# Patient Record
Sex: MALE | Race: WHITE | URBAN - METROPOLITAN AREA
[De-identification: names, ages, dates, MRNs, and addresses within clinical notes are randomized per-mention and may not be internally consistent; named-entity substitution may affect disease eponyms.]

---

## 2017-03-27 ENCOUNTER — AMBULATORY - HEALTHEAST (OUTPATIENT)
Dept: CARDIOLOGY | Facility: CLINIC | Age: 82
End: 2017-03-27

## 2017-03-27 DIAGNOSIS — I44.2 THIRD DEGREE AV BLOCK (H): ICD-10-CM

## 2017-03-27 DIAGNOSIS — I35.0 AORTIC VALVE STENOSIS, MILD: ICD-10-CM

## 2017-03-27 DIAGNOSIS — Z95.0 PACEMAKER: ICD-10-CM

## 2017-03-27 ASSESSMENT — MIFFLIN-ST. JEOR: SCORE: 1374.22

## 2017-06-28 ENCOUNTER — AMBULATORY - HEALTHEAST (OUTPATIENT)
Dept: CARDIOLOGY | Facility: CLINIC | Age: 82
End: 2017-06-28

## 2017-06-28 DIAGNOSIS — Z95.0 PACEMAKER: ICD-10-CM

## 2017-06-28 LAB — HCC DEVICE COMMENTS: NORMAL

## 2017-10-04 ENCOUNTER — AMBULATORY - HEALTHEAST (OUTPATIENT)
Dept: CARDIOLOGY | Facility: CLINIC | Age: 82
End: 2017-10-04

## 2017-10-04 DIAGNOSIS — Z95.0 PACEMAKER: ICD-10-CM

## 2017-10-04 LAB — HCC DEVICE COMMENTS: NORMAL

## 2018-01-11 ENCOUNTER — AMBULATORY - HEALTHEAST (OUTPATIENT)
Dept: CARDIOLOGY | Facility: CLINIC | Age: 83
End: 2018-01-11

## 2018-01-11 DIAGNOSIS — Z95.0 PACEMAKER: ICD-10-CM

## 2018-01-11 LAB — HCC DEVICE COMMENTS: NORMAL

## 2018-01-18 ENCOUNTER — RECORDS - HEALTHEAST (OUTPATIENT)
Dept: LAB | Facility: CLINIC | Age: 83
End: 2018-01-18

## 2018-01-19 LAB
ANION GAP SERPL CALCULATED.3IONS-SCNC: 5 MMOL/L (ref 5–18)
BASOPHILS # BLD AUTO: 0.2 THOU/UL (ref 0–0.2)
BASOPHILS NFR BLD AUTO: 2 % (ref 0–2)
BUN SERPL-MCNC: 20 MG/DL (ref 8–28)
CALCIUM SERPL-MCNC: 10.6 MG/DL (ref 8.5–10.5)
CHLORIDE BLD-SCNC: 100 MMOL/L (ref 98–107)
CO2 SERPL-SCNC: 30 MMOL/L (ref 22–31)
CREAT SERPL-MCNC: 0.81 MG/DL (ref 0.7–1.3)
EOSINOPHIL # BLD AUTO: 1.1 THOU/UL (ref 0–0.4)
EOSINOPHIL NFR BLD AUTO: 11 % (ref 0–6)
ERYTHROCYTE [DISTWIDTH] IN BLOOD BY AUTOMATED COUNT: 12.2 % (ref 11–14.5)
GFR SERPL CREATININE-BSD FRML MDRD: >60 ML/MIN/1.73M2
GLUCOSE BLD-MCNC: 98 MG/DL (ref 70–125)
HCT VFR BLD AUTO: 42.6 % (ref 40–54)
HGB BLD-MCNC: 13.9 G/DL (ref 14–18)
LYMPHOCYTES # BLD AUTO: 3.3 THOU/UL (ref 0.8–4.4)
LYMPHOCYTES NFR BLD AUTO: 35 % (ref 20–40)
MCH RBC QN AUTO: 32.5 PG (ref 27–34)
MCHC RBC AUTO-ENTMCNC: 32.6 G/DL (ref 32–36)
MCV RBC AUTO: 100 FL (ref 80–100)
MONOCYTES # BLD AUTO: 0.8 THOU/UL (ref 0–0.9)
MONOCYTES NFR BLD AUTO: 9 % (ref 2–10)
NEUTROPHILS # BLD AUTO: 4.1 THOU/UL (ref 2–7.7)
NEUTROPHILS NFR BLD AUTO: 43 % (ref 50–70)
PLATELET # BLD AUTO: 257 THOU/UL (ref 140–440)
PMV BLD AUTO: 11 FL (ref 8.5–12.5)
POTASSIUM BLD-SCNC: 4.3 MMOL/L (ref 3.5–5)
RBC # BLD AUTO: 4.28 MILL/UL (ref 4.4–6.2)
SODIUM SERPL-SCNC: 135 MMOL/L (ref 136–145)
WBC: 9.4 THOU/UL (ref 4–11)

## 2018-01-31 ENCOUNTER — AMBULATORY - HEALTHEAST (OUTPATIENT)
Dept: CARDIOLOGY | Facility: CLINIC | Age: 83
End: 2018-01-31

## 2018-01-31 DIAGNOSIS — I35.0 AORTIC STENOSIS: ICD-10-CM

## 2018-02-01 ENCOUNTER — RECORDS - HEALTHEAST (OUTPATIENT)
Dept: ADMINISTRATIVE | Facility: OTHER | Age: 83
End: 2018-02-01

## 2018-03-01 ENCOUNTER — AMBULATORY - HEALTHEAST (OUTPATIENT)
Dept: CARDIOLOGY | Facility: CLINIC | Age: 83
End: 2018-03-01

## 2018-03-01 ENCOUNTER — RECORDS - HEALTHEAST (OUTPATIENT)
Dept: ADMINISTRATIVE | Facility: OTHER | Age: 83
End: 2018-03-01

## 2018-03-07 ENCOUNTER — AMBULATORY - HEALTHEAST (OUTPATIENT)
Dept: CARDIOLOGY | Facility: CLINIC | Age: 83
End: 2018-03-07

## 2018-03-07 DIAGNOSIS — I10 ESSENTIAL HYPERTENSION, BENIGN: ICD-10-CM

## 2018-03-07 DIAGNOSIS — Z95.0 CARDIAC PACEMAKER IN SITU: ICD-10-CM

## 2018-03-07 DIAGNOSIS — I35.0 AORTIC VALVE STENOSIS, MILD: ICD-10-CM

## 2018-03-07 DIAGNOSIS — I44.2 THIRD DEGREE AV BLOCK (H): ICD-10-CM

## 2018-03-07 LAB — HCC DEVICE COMMENTS: NORMAL

## 2018-03-07 ASSESSMENT — MIFFLIN-ST. JEOR: SCORE: 1283.51

## 2018-06-06 ENCOUNTER — AMBULATORY - HEALTHEAST (OUTPATIENT)
Dept: CARDIOLOGY | Facility: CLINIC | Age: 83
End: 2018-06-06

## 2018-06-06 DIAGNOSIS — Z95.0 CARDIAC PACEMAKER IN SITU: ICD-10-CM

## 2018-06-06 LAB
HCC DEVICE COMMENTS: NORMAL
HCC DEVICE IMPLANTING PROVIDER: NORMAL
HCC DEVICE MANUFACTURE: NORMAL
HCC DEVICE MODEL: NORMAL
HCC DEVICE SERIAL NUMBER: NORMAL
HCC DEVICE TYPE: NORMAL

## 2018-08-20 ENCOUNTER — HOME CARE/HOSPICE - HEALTHEAST (OUTPATIENT)
Dept: HOSPICE | Facility: HOSPICE | Age: 83
End: 2018-08-20

## 2018-08-21 ENCOUNTER — HOME CARE/HOSPICE - HEALTHEAST (OUTPATIENT)
Dept: HOSPICE | Facility: HOSPICE | Age: 83
End: 2018-08-21

## 2018-08-22 ENCOUNTER — HOME CARE/HOSPICE - HEALTHEAST (OUTPATIENT)
Dept: HOSPICE | Facility: HOSPICE | Age: 83
End: 2018-08-22

## 2018-08-27 ENCOUNTER — OFFICE VISIT - HEALTHEAST (OUTPATIENT)
Dept: GERIATRICS | Facility: CLINIC | Age: 83
End: 2018-08-27

## 2018-08-27 DIAGNOSIS — R62.7 ADULT FAILURE TO THRIVE: ICD-10-CM

## 2018-08-27 DIAGNOSIS — G89.29 CHRONIC PAIN: ICD-10-CM

## 2018-08-27 DIAGNOSIS — I48.91 ATRIAL FIBRILLATION (H): ICD-10-CM

## 2018-08-27 DIAGNOSIS — D64.9 ANEMIA: ICD-10-CM

## 2018-08-27 DIAGNOSIS — I10 ESSENTIAL HYPERTENSION: ICD-10-CM

## 2018-09-12 ENCOUNTER — AMBULATORY - HEALTHEAST (OUTPATIENT)
Dept: CARDIOLOGY | Facility: CLINIC | Age: 83
End: 2018-09-12

## 2018-09-12 DIAGNOSIS — Z95.0 CARDIAC PACEMAKER IN SITU: ICD-10-CM

## 2018-09-26 ENCOUNTER — OFFICE VISIT - HEALTHEAST (OUTPATIENT)
Dept: GERIATRICS | Facility: CLINIC | Age: 83
End: 2018-09-26

## 2018-09-26 DIAGNOSIS — I10 ESSENTIAL HYPERTENSION, BENIGN: ICD-10-CM

## 2018-09-26 DIAGNOSIS — M54.9 BACK PAIN: ICD-10-CM

## 2018-09-26 DIAGNOSIS — M62.81 GENERALIZED MUSCLE WEAKNESS: ICD-10-CM

## 2018-09-26 RX ORDER — BISACODYL 10 MG
10 SUPPOSITORY, RECTAL RECTAL DAILY PRN
Status: SHIPPED | COMMUNITY
Start: 2018-09-26

## 2018-09-26 RX ORDER — CALCIUM CARBONATE 500 MG/1
1 TABLET, CHEWABLE ORAL 3 TIMES DAILY PRN
Status: SHIPPED | COMMUNITY
Start: 2018-09-26

## 2018-10-30 ENCOUNTER — OFFICE VISIT - HEALTHEAST (OUTPATIENT)
Dept: GERIATRICS | Facility: CLINIC | Age: 83
End: 2018-10-30

## 2018-10-30 DIAGNOSIS — I48.91 ATRIAL FIBRILLATION (H): ICD-10-CM

## 2018-10-30 DIAGNOSIS — D64.9 ANEMIA: ICD-10-CM

## 2018-10-30 DIAGNOSIS — I10 HYPERTENSION: ICD-10-CM

## 2018-10-30 DIAGNOSIS — G89.29 CHRONIC PAIN: ICD-10-CM

## 2018-11-14 ENCOUNTER — OFFICE VISIT - HEALTHEAST (OUTPATIENT)
Dept: GERIATRICS | Facility: CLINIC | Age: 83
End: 2018-11-14

## 2018-11-14 DIAGNOSIS — R06.02 SOB (SHORTNESS OF BREATH): ICD-10-CM

## 2018-11-14 DIAGNOSIS — I10 ESSENTIAL HYPERTENSION, BENIGN: ICD-10-CM

## 2018-11-15 ENCOUNTER — COMMUNICATION - HEALTHEAST (OUTPATIENT)
Dept: GERIATRICS | Facility: CLINIC | Age: 83
End: 2018-11-15

## 2018-11-15 ENCOUNTER — RECORDS - HEALTHEAST (OUTPATIENT)
Dept: LAB | Facility: CLINIC | Age: 83
End: 2018-11-15

## 2018-11-15 LAB
ANION GAP SERPL CALCULATED.3IONS-SCNC: 7 MMOL/L (ref 5–18)
BUN SERPL-MCNC: 16 MG/DL (ref 8–28)
CALCIUM SERPL-MCNC: 10.3 MG/DL (ref 8.5–10.5)
CHLORIDE BLD-SCNC: 100 MMOL/L (ref 98–107)
CO2 SERPL-SCNC: 30 MMOL/L (ref 22–31)
CREAT SERPL-MCNC: 0.71 MG/DL (ref 0.7–1.3)
ERYTHROCYTE [DISTWIDTH] IN BLOOD BY AUTOMATED COUNT: 12.6 % (ref 11–14.5)
GFR SERPL CREATININE-BSD FRML MDRD: >60 ML/MIN/1.73M2
GLUCOSE BLD-MCNC: 84 MG/DL (ref 70–125)
HCT VFR BLD AUTO: 39.9 % (ref 40–54)
HGB BLD-MCNC: 12.6 G/DL (ref 14–18)
MCH RBC QN AUTO: 32.1 PG (ref 27–34)
MCHC RBC AUTO-ENTMCNC: 31.6 G/DL (ref 32–36)
MCV RBC AUTO: 102 FL (ref 80–100)
PLATELET # BLD AUTO: 288 THOU/UL (ref 140–440)
PMV BLD AUTO: 10.5 FL (ref 8.5–12.5)
POTASSIUM BLD-SCNC: 4.3 MMOL/L (ref 3.5–5)
RBC # BLD AUTO: 3.93 MILL/UL (ref 4.4–6.2)
SODIUM SERPL-SCNC: 137 MMOL/L (ref 136–145)
WBC: 11 THOU/UL (ref 4–11)

## 2018-11-20 ENCOUNTER — RECORDS - HEALTHEAST (OUTPATIENT)
Dept: LAB | Facility: CLINIC | Age: 83
End: 2018-11-20

## 2018-11-21 LAB
ANION GAP SERPL CALCULATED.3IONS-SCNC: 5 MMOL/L (ref 5–18)
BUN SERPL-MCNC: 19 MG/DL (ref 8–28)
CALCIUM SERPL-MCNC: 10.1 MG/DL (ref 8.5–10.5)
CHLORIDE BLD-SCNC: 100 MMOL/L (ref 98–107)
CO2 SERPL-SCNC: 30 MMOL/L (ref 22–31)
CREAT SERPL-MCNC: 0.71 MG/DL (ref 0.7–1.3)
GFR SERPL CREATININE-BSD FRML MDRD: >60 ML/MIN/1.73M2
GLUCOSE BLD-MCNC: 89 MG/DL (ref 70–125)
POTASSIUM BLD-SCNC: 4.4 MMOL/L (ref 3.5–5)
SODIUM SERPL-SCNC: 135 MMOL/L (ref 136–145)

## 2018-11-23 ENCOUNTER — RECORDS - HEALTHEAST (OUTPATIENT)
Dept: LAB | Facility: CLINIC | Age: 83
End: 2018-11-23

## 2018-11-26 LAB
ANION GAP SERPL CALCULATED.3IONS-SCNC: 8 MMOL/L (ref 5–18)
BUN SERPL-MCNC: 18 MG/DL (ref 8–28)
CALCIUM SERPL-MCNC: 10.4 MG/DL (ref 8.5–10.5)
CHLORIDE BLD-SCNC: 100 MMOL/L (ref 98–107)
CO2 SERPL-SCNC: 29 MMOL/L (ref 22–31)
CREAT SERPL-MCNC: 0.75 MG/DL (ref 0.7–1.3)
GFR SERPL CREATININE-BSD FRML MDRD: >60 ML/MIN/1.73M2
GLUCOSE BLD-MCNC: 93 MG/DL (ref 70–125)
POTASSIUM BLD-SCNC: 4.3 MMOL/L (ref 3.5–5)
SODIUM SERPL-SCNC: 137 MMOL/L (ref 136–145)

## 2018-12-18 ENCOUNTER — AMBULATORY - HEALTHEAST (OUTPATIENT)
Dept: CARDIOLOGY | Facility: CLINIC | Age: 83
End: 2018-12-18

## 2018-12-18 DIAGNOSIS — Z95.0 CARDIAC PACEMAKER IN SITU: ICD-10-CM

## 2019-01-02 ENCOUNTER — OFFICE VISIT - HEALTHEAST (OUTPATIENT)
Dept: GERIATRICS | Facility: CLINIC | Age: 84
End: 2019-01-02

## 2019-01-02 DIAGNOSIS — M62.81 GENERALIZED MUSCLE WEAKNESS: ICD-10-CM

## 2019-01-02 DIAGNOSIS — G89.4 CHRONIC PAIN SYNDROME: ICD-10-CM

## 2019-01-02 DIAGNOSIS — I10 ESSENTIAL HYPERTENSION, BENIGN: ICD-10-CM

## 2019-01-02 DIAGNOSIS — I48.91 ATRIAL FIBRILLATION, UNSPECIFIED TYPE (H): ICD-10-CM

## 2019-01-18 ENCOUNTER — AMBULATORY - HEALTHEAST (OUTPATIENT)
Dept: CARDIOLOGY | Facility: CLINIC | Age: 84
End: 2019-01-18

## 2019-01-18 DIAGNOSIS — I35.0 AORTIC STENOSIS: ICD-10-CM

## 2019-02-15 ENCOUNTER — AMBULATORY - HEALTHEAST (OUTPATIENT)
Dept: CARDIOLOGY | Facility: CLINIC | Age: 84
End: 2019-02-15

## 2019-02-21 ENCOUNTER — RECORDS - HEALTHEAST (OUTPATIENT)
Dept: ADMINISTRATIVE | Facility: OTHER | Age: 84
End: 2019-02-21

## 2019-02-26 ENCOUNTER — OFFICE VISIT - HEALTHEAST (OUTPATIENT)
Dept: GERIATRICS | Facility: CLINIC | Age: 84
End: 2019-02-26

## 2019-02-26 DIAGNOSIS — I48.0 PAROXYSMAL ATRIAL FIBRILLATION (H): ICD-10-CM

## 2019-02-26 DIAGNOSIS — G89.4 CHRONIC PAIN SYNDROME: ICD-10-CM

## 2019-02-26 DIAGNOSIS — I10 ESSENTIAL HYPERTENSION: ICD-10-CM

## 2019-02-26 DIAGNOSIS — D64.9 ANEMIA, UNSPECIFIED TYPE: ICD-10-CM

## 2019-03-20 ENCOUNTER — AMBULATORY - HEALTHEAST (OUTPATIENT)
Dept: CARDIOLOGY | Facility: CLINIC | Age: 84
End: 2019-03-20

## 2019-03-20 DIAGNOSIS — Z95.0 CARDIAC PACEMAKER IN SITU: ICD-10-CM

## 2019-04-03 ENCOUNTER — OFFICE VISIT - HEALTHEAST (OUTPATIENT)
Dept: GERIATRICS | Facility: CLINIC | Age: 84
End: 2019-04-03

## 2019-04-03 DIAGNOSIS — M62.81 GENERALIZED MUSCLE WEAKNESS: ICD-10-CM

## 2019-04-03 DIAGNOSIS — G89.4 CHRONIC PAIN SYNDROME: ICD-10-CM

## 2019-04-03 DIAGNOSIS — I10 ESSENTIAL HYPERTENSION: ICD-10-CM

## 2019-04-03 DIAGNOSIS — I48.0 PAROXYSMAL ATRIAL FIBRILLATION (H): ICD-10-CM

## 2019-04-04 ENCOUNTER — RECORDS - HEALTHEAST (OUTPATIENT)
Dept: LAB | Facility: CLINIC | Age: 84
End: 2019-04-04

## 2019-04-05 LAB
ANION GAP SERPL CALCULATED.3IONS-SCNC: 5 MMOL/L (ref 5–18)
BUN SERPL-MCNC: 16 MG/DL (ref 8–28)
CALCIUM SERPL-MCNC: 10.1 MG/DL (ref 8.5–10.5)
CHLORIDE BLD-SCNC: 103 MMOL/L (ref 98–107)
CO2 SERPL-SCNC: 30 MMOL/L (ref 22–31)
CREAT SERPL-MCNC: 0.67 MG/DL (ref 0.7–1.3)
GFR SERPL CREATININE-BSD FRML MDRD: >60 ML/MIN/1.73M2
GLUCOSE BLD-MCNC: 84 MG/DL (ref 70–125)
POTASSIUM BLD-SCNC: 4.1 MMOL/L (ref 3.5–5)
SODIUM SERPL-SCNC: 138 MMOL/L (ref 136–145)

## 2019-06-24 ENCOUNTER — AMBULATORY - HEALTHEAST (OUTPATIENT)
Dept: CARDIOLOGY | Facility: CLINIC | Age: 84
End: 2019-06-24

## 2019-06-24 DIAGNOSIS — Z95.0 CARDIAC PACEMAKER IN SITU: ICD-10-CM

## 2019-06-27 ENCOUNTER — OFFICE VISIT - HEALTHEAST (OUTPATIENT)
Dept: GERIATRICS | Facility: CLINIC | Age: 84
End: 2019-06-27

## 2019-06-27 DIAGNOSIS — G89.4 CHRONIC PAIN SYNDROME: ICD-10-CM

## 2019-06-27 DIAGNOSIS — I10 ESSENTIAL HYPERTENSION: ICD-10-CM

## 2019-06-27 DIAGNOSIS — R53.81 DEBILITY: ICD-10-CM

## 2019-06-27 DIAGNOSIS — I48.0 PAROXYSMAL ATRIAL FIBRILLATION (H): ICD-10-CM

## 2019-07-08 ENCOUNTER — COMMUNICATION - HEALTHEAST (OUTPATIENT)
Dept: CARDIOLOGY | Facility: CLINIC | Age: 84
End: 2019-07-08

## 2019-07-25 ENCOUNTER — OFFICE VISIT - HEALTHEAST (OUTPATIENT)
Dept: GERIATRICS | Facility: CLINIC | Age: 84
End: 2019-07-25

## 2019-07-25 DIAGNOSIS — R68.89 CONGESTION OF THROAT: ICD-10-CM

## 2019-08-27 ENCOUNTER — RECORDS - HEALTHEAST (OUTPATIENT)
Dept: ADMINISTRATIVE | Facility: OTHER | Age: 84
End: 2019-08-27

## 2019-09-11 ENCOUNTER — OFFICE VISIT - HEALTHEAST (OUTPATIENT)
Dept: GERIATRICS | Facility: CLINIC | Age: 84
End: 2019-09-11

## 2019-09-11 DIAGNOSIS — G89.29 OTHER CHRONIC PAIN: ICD-10-CM

## 2019-09-11 DIAGNOSIS — I48.91 ATRIAL FIBRILLATION, UNSPECIFIED TYPE (H): ICD-10-CM

## 2019-09-11 DIAGNOSIS — I10 ESSENTIAL HYPERTENSION: ICD-10-CM

## 2019-09-11 DIAGNOSIS — R53.81 DEBILITY: ICD-10-CM

## 2019-09-24 ENCOUNTER — AMBULATORY - HEALTHEAST (OUTPATIENT)
Dept: CARDIOLOGY | Facility: CLINIC | Age: 84
End: 2019-09-24

## 2019-09-24 DIAGNOSIS — Z95.0 CARDIAC PACEMAKER IN SITU: ICD-10-CM

## 2019-10-31 ENCOUNTER — OFFICE VISIT - HEALTHEAST (OUTPATIENT)
Dept: GERIATRICS | Facility: CLINIC | Age: 84
End: 2019-10-31

## 2019-10-31 DIAGNOSIS — I10 ESSENTIAL HYPERTENSION: ICD-10-CM

## 2019-10-31 DIAGNOSIS — G89.4 CHRONIC PAIN SYNDROME: ICD-10-CM

## 2019-10-31 DIAGNOSIS — I48.91 ATRIAL FIBRILLATION, UNSPECIFIED TYPE (H): ICD-10-CM

## 2019-10-31 DIAGNOSIS — R53.81 DEBILITY: ICD-10-CM

## 2019-12-06 ENCOUNTER — OFFICE VISIT - HEALTHEAST (OUTPATIENT)
Dept: GERIATRICS | Facility: CLINIC | Age: 84
End: 2019-12-06

## 2019-12-06 DIAGNOSIS — R68.89 CONGESTION OF THROAT: ICD-10-CM

## 2019-12-06 DIAGNOSIS — G89.4 CHRONIC PAIN SYNDROME: ICD-10-CM

## 2019-12-06 DIAGNOSIS — I48.91 ATRIAL FIBRILLATION, UNSPECIFIED TYPE (H): ICD-10-CM

## 2019-12-16 ENCOUNTER — RECORDS - HEALTHEAST (OUTPATIENT)
Dept: LAB | Facility: CLINIC | Age: 84
End: 2019-12-16

## 2019-12-16 ENCOUNTER — OFFICE VISIT - HEALTHEAST (OUTPATIENT)
Dept: GERIATRICS | Facility: CLINIC | Age: 84
End: 2019-12-16

## 2019-12-16 DIAGNOSIS — R05.9 COUGH: ICD-10-CM

## 2019-12-16 DIAGNOSIS — R09.89 BIBASILAR CRACKLES: ICD-10-CM

## 2019-12-16 DIAGNOSIS — R50.9 FEVER, UNSPECIFIED FEVER CAUSE: ICD-10-CM

## 2019-12-16 LAB
ERYTHROCYTE [DISTWIDTH] IN BLOOD BY AUTOMATED COUNT: 12.6 % (ref 11–14.5)
HCT VFR BLD AUTO: 37.2 % (ref 40–54)
HGB BLD-MCNC: 12.1 G/DL (ref 14–18)
MCH RBC QN AUTO: 31.9 PG (ref 27–34)
MCHC RBC AUTO-ENTMCNC: 32.5 G/DL (ref 32–36)
MCV RBC AUTO: 98 FL (ref 80–100)
PLATELET # BLD AUTO: 208 THOU/UL (ref 140–440)
PMV BLD AUTO: 10.4 FL (ref 8.5–12.5)
RBC # BLD AUTO: 3.79 MILL/UL (ref 4.4–6.2)
WBC: 15.4 THOU/UL (ref 4–11)

## 2019-12-16 RX ORDER — METOPROLOL SUCCINATE 25 MG/1
25 TABLET, EXTENDED RELEASE ORAL DAILY
Status: SHIPPED | COMMUNITY
Start: 2019-12-16

## 2019-12-18 ENCOUNTER — AMBULATORY - HEALTHEAST (OUTPATIENT)
Dept: CARDIOLOGY | Facility: CLINIC | Age: 84
End: 2019-12-18

## 2019-12-18 DIAGNOSIS — Z95.0 CARDIAC PACEMAKER IN SITU: ICD-10-CM

## 2019-12-18 DIAGNOSIS — I44.2 THIRD DEGREE AV BLOCK (H): ICD-10-CM

## 2019-12-26 ENCOUNTER — AMBULATORY - HEALTHEAST (OUTPATIENT)
Dept: CARDIOLOGY | Facility: CLINIC | Age: 84
End: 2019-12-26

## 2019-12-26 DIAGNOSIS — Z95.0 CARDIAC PACEMAKER IN SITU: ICD-10-CM

## 2019-12-26 DIAGNOSIS — I44.2 THIRD DEGREE AV BLOCK (H): ICD-10-CM

## 2020-01-01 ENCOUNTER — OFFICE VISIT - HEALTHEAST (OUTPATIENT)
Dept: GERIATRICS | Facility: CLINIC | Age: 85
End: 2020-01-01

## 2020-01-01 ENCOUNTER — COMMUNICATION - HEALTHEAST (OUTPATIENT)
Dept: CARDIOLOGY | Facility: CLINIC | Age: 85
End: 2020-01-01

## 2020-01-01 ENCOUNTER — AMBULATORY - HEALTHEAST (OUTPATIENT)
Dept: CARDIOLOGY | Facility: CLINIC | Age: 85
End: 2020-01-01

## 2020-01-01 ENCOUNTER — RECORDS - HEALTHEAST (OUTPATIENT)
Dept: ADMINISTRATIVE | Facility: OTHER | Age: 85
End: 2020-01-01

## 2020-01-01 DIAGNOSIS — I10 ESSENTIAL HYPERTENSION: ICD-10-CM

## 2020-01-01 DIAGNOSIS — R05.9 COUGH: ICD-10-CM

## 2020-01-01 DIAGNOSIS — G89.4 CHRONIC PAIN SYNDROME: ICD-10-CM

## 2020-01-01 DIAGNOSIS — I44.2 THIRD DEGREE AV BLOCK (H): ICD-10-CM

## 2020-01-01 DIAGNOSIS — R53.81 DEBILITATED: ICD-10-CM

## 2020-01-01 DIAGNOSIS — Z95.0 CARDIAC PACEMAKER IN SITU: ICD-10-CM

## 2020-01-01 DIAGNOSIS — R53.81 PHYSICAL DECONDITIONING: ICD-10-CM

## 2020-01-01 DIAGNOSIS — R53.81 DEBILITY: ICD-10-CM

## 2020-01-01 DIAGNOSIS — I10 ESSENTIAL HYPERTENSION, BENIGN: ICD-10-CM

## 2020-01-01 DIAGNOSIS — I48.91 ATRIAL FIBRILLATION, UNSPECIFIED TYPE (H): ICD-10-CM

## 2020-01-01 DIAGNOSIS — M62.81 GENERALIZED MUSCLE WEAKNESS: ICD-10-CM

## 2020-01-01 DIAGNOSIS — I48.0 PAROXYSMAL ATRIAL FIBRILLATION (H): ICD-10-CM

## 2020-01-01 DIAGNOSIS — M15.0 PRIMARY OSTEOARTHRITIS INVOLVING MULTIPLE JOINTS: ICD-10-CM

## 2020-01-01 DIAGNOSIS — R53.81 PHYSICAL DEBILITY: ICD-10-CM

## 2020-01-01 RX ORDER — LORATADINE 10 MG/1
10 TABLET ORAL DAILY
Status: SHIPPED | COMMUNITY
Start: 2020-01-01

## 2021-01-01 ENCOUNTER — RECORDS - HEALTHEAST (OUTPATIENT)
Dept: LAB | Facility: CLINIC | Age: 86
End: 2021-01-01

## 2021-01-01 ENCOUNTER — AMBULATORY - HEALTHEAST (OUTPATIENT)
Dept: CARDIOLOGY | Facility: CLINIC | Age: 86
End: 2021-01-01

## 2021-01-01 ENCOUNTER — COMMUNICATION - HEALTHEAST (OUTPATIENT)
Dept: GERIATRICS | Facility: CLINIC | Age: 86
End: 2021-01-01

## 2021-01-01 DIAGNOSIS — I44.2 THIRD DEGREE AV BLOCK (H): ICD-10-CM

## 2021-01-01 DIAGNOSIS — Z95.0 CARDIAC PACEMAKER IN SITU: ICD-10-CM

## 2021-01-01 LAB
ALBUMIN UR-MCNC: ABNORMAL MG/DL
ANION GAP SERPL CALCULATED.3IONS-SCNC: 11 MMOL/L (ref 5–18)
APPEARANCE UR: ABNORMAL
BACTERIA #/AREA URNS HPF: ABNORMAL HPF
BACTERIA SPEC CULT: NO GROWTH
BASOPHILS # BLD AUTO: 0.1 THOU/UL (ref 0–0.2)
BASOPHILS NFR BLD AUTO: 1 % (ref 0–2)
BILIRUB UR QL STRIP: NEGATIVE
BUN SERPL-MCNC: 23 MG/DL (ref 8–28)
CALCIUM SERPL-MCNC: 9.7 MG/DL (ref 8.5–10.5)
CAOX CRY #/AREA URNS HPF: PRESENT /[HPF]
CHLORIDE BLD-SCNC: 97 MMOL/L (ref 98–107)
CO2 SERPL-SCNC: 28 MMOL/L (ref 22–31)
COLOR UR AUTO: YELLOW
CREAT SERPL-MCNC: 0.81 MG/DL (ref 0.7–1.3)
EOSINOPHIL COUNT (ABSOLUTE): 0 THOU/UL (ref 0–0.4)
EOSINOPHIL NFR BLD AUTO: 0 % (ref 0–6)
ERYTHROCYTE [DISTWIDTH] IN BLOOD BY AUTOMATED COUNT: 13 % (ref 11–14.5)
FLUAV AG SPEC QL IA: NORMAL
FLUBV AG SPEC QL IA: NORMAL
GFR SERPL CREATININE-BSD FRML MDRD: >60 ML/MIN/1.73M2
GLUCOSE BLD-MCNC: 81 MG/DL (ref 70–125)
GLUCOSE UR STRIP-MCNC: NEGATIVE MG/DL
HCC DEVICE COMMENTS: NORMAL
HCC DEVICE IMPLANTING PROVIDER: NORMAL
HCC DEVICE MANUFACTURE: NORMAL
HCC DEVICE MODEL: NORMAL
HCC DEVICE SERIAL NUMBER: NORMAL
HCC DEVICE TYPE: NORMAL
HCT VFR BLD AUTO: 45 % (ref 40–54)
HGB BLD-MCNC: 14.7 G/DL (ref 14–18)
HGB UR QL STRIP: NEGATIVE
HYALINE CASTS #/AREA URNS LPF: ABNORMAL LPF
IMMATURE GRANULOCYTE % - MAN (DIFF): 0 %
IMMATURE GRANULOCYTE ABSOLUTE - MAN (DIFF): 0 THOU/UL
KETONES UR STRIP-MCNC: ABNORMAL MG/DL
LEUKOCYTE ESTERASE UR QL STRIP: ABNORMAL
LYMPHOCYTES # BLD AUTO: 1.5 THOU/UL (ref 0.8–4.4)
LYMPHOCYTES NFR BLD AUTO: 12 % (ref 20–40)
MCH RBC QN AUTO: 32.4 PG (ref 27–34)
MCHC RBC AUTO-ENTMCNC: 32.7 G/DL (ref 32–36)
MCV RBC AUTO: 99 FL (ref 80–100)
MONOCYTES # BLD AUTO: 0.7 THOU/UL (ref 0–0.9)
MONOCYTES NFR BLD AUTO: 6 % (ref 2–10)
MUCOUS THREADS #/AREA URNS LPF: ABNORMAL LPF
NITRATE UR QL: NEGATIVE
PH UR STRIP: 6 [PH] (ref 4.5–8)
PLAT MORPH BLD: NORMAL
PLATELET # BLD AUTO: 254 THOU/UL (ref 140–440)
PMV BLD AUTO: 10.1 FL (ref 8.5–12.5)
POTASSIUM BLD-SCNC: 4.4 MMOL/L (ref 3.5–5)
PROCALCITONIN SERPL-MCNC: 0.04 NG/ML (ref 0–0.49)
RBC # BLD AUTO: 4.54 MILL/UL (ref 4.4–6.2)
RBC #/AREA URNS AUTO: ABNORMAL HPF
SODIUM SERPL-SCNC: 136 MMOL/L (ref 136–145)
SP GR UR STRIP: 1.02 (ref 1–1.03)
SQUAMOUS #/AREA URNS AUTO: ABNORMAL LPF
TOTAL NEUTROPHILS-ABS(DIFF): 10 THOU/UL (ref 2–7.7)
TOTAL NEUTROPHILS-REL(DIFF): 81 % (ref 50–70)
UROBILINOGEN UR STRIP-ACNC: ABNORMAL
WBC #/AREA URNS AUTO: ABNORMAL HPF
WBC: 12.3 THOU/UL (ref 4–11)

## 2021-02-24 ENCOUNTER — AMBULATORY - HEALTHEAST (OUTPATIENT)
Dept: GERIATRICS | Facility: CLINIC | Age: 86
End: 2021-02-24

## 2021-05-27 NOTE — PROGRESS NOTES
[facility]    Facility:   [facility] [[id number]]   Code Status: DNR/DNI      CHIEF COMPLAINT/REASON FOR VISIT:  Chief Complaint   Patient presents with     Review Of Multiple Medical Conditions       HISTORY:      HPI: Kelvin is a 89 y.o. male residing at [facility]. He is with history of chronic back pain ( on oxycodone), multiple chronic compression fractures. Hypertension, atrial fibrillation not on coumadin He is new to this facility. He was admitted to [facility] on 8/21/18. He was recently hospitalized on 8/19 for failure to thrive,  weakness and pneumonia      Today he is seen for a routine medical visit to review multiple medical issues. . He was seen in his room. He denied CP.  He denied HA.  He does have chronic back pain but he denied it today.  His Blood pressures were reviewed and his SBP have been running  160-170's. He is on lisinopril and his clonidine was increased today.  His weights is down 3 pounds over the last  month.      Past Medical History:   Diagnosis Date     Aortic stenosis      Blind right eye      Cholelithiasis      Coronary artery disease     atherosclerotic aorta     Diverticular disease      Heart block AV third degree (H)      Heart murmur      Hyperlipidemia      Hypertension      Inguinal hernia     right     L1 vertebral fracture (H) 08/18/2014     Leukocytosis      Pacemaker      Pneumonia      Postoperative atrial fibrillation (H)     Had for few days after pacemaker placement     Prostate cancer (H)     prostate cancer, skin cancer     Syncope 01/2014     T12 vertebral fracture (H) 08/18/2014             Family History   Problem Relation Age of Onset     Heart attack Father      Social History     Socioeconomic History     Marital status:      Spouse name: Not on file     Number of children: Not on file     Years of education: Not on file     Highest education level: Not on file   Occupational History     Not on  file   Social Needs     Financial resource strain: Not on file     Food insecurity:     Worry: Not on file     Inability: Not on file     Transportation needs:     Medical: Not on file     Non-medical: Not on file   Tobacco Use     Smoking status: Former Smoker     Years: 10.00     Last attempt to quit: 1951     Years since quittin.2     Smokeless tobacco: Never Used   Substance and Sexual Activity     Alcohol use: No     Alcohol/week: 1.0 oz     Types: 2 Standard drinks or equivalent per week     Comment: rarely     Drug use: No     Sexual activity: No   Lifestyle     Physical activity:     Days per week: Not on file     Minutes per session: Not on file     Stress: Not on file   Relationships     Social connections:     Talks on phone: Not on file     Gets together: Not on file     Attends Synagogue service: Not on file     Active member of club or organization: Not on file     Attends meetings of clubs or organizations: Not on file     Relationship status: Not on file     Intimate partner violence:     Fear of current or ex partner: Not on file     Emotionally abused: Not on file     Physically abused: Not on file     Forced sexual activity: Not on file   Other Topics Concern     Not on file   Social History Narrative    The patient lives in an assisted living facility.          Review of Systems   Constitutional: Positive for fatigue. Negative for activity change, appetite change, chills and fever.   HENT: Positive for hearing loss. Negative for congestion and sore throat.         Wears HA    Eyes: Positive for visual disturbance.        Poor vision    Respiratory: Positive for cough. Negative for shortness of breath and wheezing.    Cardiovascular: Negative for chest pain and leg swelling.   Gastrointestinal: Negative for abdominal distention, abdominal pain, diarrhea and nausea.   Genitourinary: Negative for dysuria.   Musculoskeletal: Negative for arthralgias and myalgias.   Skin: Negative for color  change, rash and wound.   Neurological: Positive for weakness. Negative for dizziness and numbness.   Psychiatric/Behavioral: Negative for agitation, behavioral problems, confusion and sleep disturbance.       .  Vitals:    04/03/19 1442   BP: 170/90   Pulse: 64   Resp: 20   Temp: 98  F (36.7  C)   SpO2: 96%   Weight: 159 lb 6.4 oz (72.3 kg)       Physical Exam   Constitutional: He is oriented to person, place, and time. He appears well-developed and well-nourished.   HENT:   Head: Normocephalic.   Eyes: Conjunctivae are normal.   Neck: Normal range of motion.   Cardiovascular: Normal rate and regular rhythm.   Murmur heard.  pacemaker   Pulmonary/Chest: No respiratory distress. He has no wheezes.   Abdominal: Soft. Bowel sounds are normal. He exhibits no distension. There is no tenderness.   Musculoskeletal: Normal range of motion. He exhibits no edema.   Neurological: He is alert and oriented to person, place, and time.   Skin: Skin is warm and dry.   Psychiatric: He has a normal mood and affect. His behavior is normal.         LABS:   No results found for this or any previous visit (from the past 240 hour(s)).  Current Outpatient Medications   Medication Sig     acetaminophen (TYLENOL) 325 MG tablet Take 650 mg by mouth 4 (four) times a day. Takes at 0200, 0800, 1400, and 2000.     bisacodyl (DULCOLAX) 10 mg suppository Insert 10 mg into the rectum daily as needed.     calcium, as carbonate, (TUMS) 200 mg calcium (500 mg) chewable tablet Chew 1 tablet 3 (three) times a day as needed for heartburn.     cloNIDine HCl (CATAPRES) 0.2 MG tablet Take 0.2 mg by mouth 2 (two) times a day.     leuprolide (LUPRON) 30 mg injection Inject 30 mg into the shoulder, thigh, or buttocks every 4 (four) months.     lisinopril (PRINIVIL,ZESTRIL) 5 MG tablet Take 40 mg by mouth daily.            oxyCODONE (ROXICODONE) 5 MG immediate release tablet Take 0.5 tablets (2.5 mg total) by mouth 3 (three) times a day. Takes at 0800, 1400,  and 2000     oxyCODONE (ROXICODONE) 5 MG immediate release tablet Take 0.5 tablets (2.5 mg total) by mouth 2 (two) times a day as needed for pain.     senna-docusate (PERICOLACE) 8.6-50 mg tablet Take 2 tablets by mouth 2 (two) times a day.     ASSESSMENT:      ICD-10-CM    1. Essential hypertension I10    2. Paroxysmal atrial fibrillation (H) I48.0    3. Chronic pain syndrome G89.4    4. Generalized muscle weakness M62.81        PLAN:    Hypertension-lisinopril to 40 mg daily, increase clonidine to 0.3 two times a day  Constipation- resolved  Back pain-  oxycodone - chronic denied today.   Weakness. Overall failure to thrive, advanced age.   Atrial fibrillation - not on coumadin   Cough- intermittent  Non productive.     Electronically signed by: Michelle Mitchell CNP

## 2021-05-30 VITALS — WEIGHT: 180 LBS | BODY MASS INDEX: 28.93 KG/M2 | HEIGHT: 66 IN

## 2021-05-30 NOTE — TELEPHONE ENCOUNTER
V/o for remotes only per Dr. Donovan. Requested the Device Specialist schedule a remote with the patient's daughter.     Hazel Lara RN BSN  [facility]

## 2021-05-30 NOTE — PROGRESS NOTES
[facility]    Facility:   [facility] [[id number]]   Code Status: DNR/DNI       Chief Complaint   Patient presents with     Review Of Multiple Medical Conditions     LT 6/27/19.       HPI:   Kelvin is a 89 y.o. male with hx of HTN, chronic pain, compression fractures, prostate cancer, seen for routine physician follow up in LT. He was hospitalized Aug 2018 for concern of pneumonia. Hx of multiple compression fractures, weakness and FTT. Ultimately admitted to LT here at [facility].      Today:  No interim concerns since my last visit. He has some chronic pain for which he is on scheduled oxycodone and acetaminophen. Appears adequately controlled, no need to make changes. He is treated for HTN with lisinopril and clonidine. He denies headaches, dizziness or palpitations. He has occ cough which is not new. No shortness of breath or chest pain. No fever reported. He has not had any medication changes. No concerns per nursing. He is hard of hearing, likes to spend time in his room reading but also goes to some activities. He propels himself in his wheelchair.       Past Medical History:  Past Medical History:   Diagnosis Date     Aortic stenosis      Blind right eye      Cholelithiasis      Coronary artery disease     atherosclerotic aorta     Diverticular disease      Heart block AV third degree (H)      Heart murmur      Hyperlipidemia      Hypertension      Inguinal hernia     right     L1 vertebral fracture (H) 08/18/2014     Leukocytosis      Pacemaker      Pneumonia      Postoperative atrial fibrillation (H)     Had for few days after pacemaker placement     Prostate cancer (H)     prostate cancer, skin cancer     Syncope 01/2014     T12 vertebral fracture (H) 08/18/2014       Medications:  Current Outpatient Medications   Medication Sig     acetaminophen (TYLENOL) 325 MG tablet Take 650 mg by mouth 4 (four) times a day. Takes at 0200, 0800, 1400, and  2000.     bisacodyl (DULCOLAX) 10 mg suppository Insert 10 mg into the rectum daily as needed.     calcium, as carbonate, (TUMS) 200 mg calcium (500 mg) chewable tablet Chew 1 tablet 3 (three) times a day as needed for heartburn.     cloNIDine HCl (CATAPRES) 0.2 MG tablet Take 0.2 mg by mouth 2 (two) times a day.     leuprolide (LUPRON) 30 mg injection Inject 30 mg into the shoulder, thigh, or buttocks every 4 (four) months.     lisinopril (PRINIVIL,ZESTRIL) 5 MG tablet Take 40 mg by mouth daily.            oxyCODONE (ROXICODONE) 5 MG immediate release tablet Take 0.5 tablets (2.5 mg total) by mouth 3 (three) times a day. Takes at 0800, 1400, and 2000     oxyCODONE (ROXICODONE) 5 MG immediate release tablet Take 0.5 tablets (2.5 mg total) by mouth 2 (two) times a day as needed for pain.     senna-docusate (PERICOLACE) 8.6-50 mg tablet Take 2 tablets by mouth 2 (two) times a day.       Physical Exam:   General: Patient is alert, elderly male, [place], no distress.   Vitals: /86, Temp 98.2, Pulse 66, RR 20, O2 sat 98% RA  HEENT: Head is NCAT. Eyes show no injection or icterus. Nares negative. Oropharynx well hydrated.  Neck: Supple. No tenderness or adenopathy. No JVD.  Lungs: Diminished at bases. No wheezes.  Cardiovascular: Regular rate and rhythm, systolic murmur.  Back: No spinal or CVA tenderness.  Abdomen: Soft, no tenderness on exam. Bowel sounds present. No guarding rebound or rigidity.  Extremities: No LE edema.  Musculoskeletal: Age related degen changes.  Skin: Warm and dry.  Psych: Mood appears pretty good.      Labs:  Lab Results   Component Value Date    WBC 11.0 11/15/2018    HGB 12.6 (L) 11/15/2018    HCT 39.9 (L) 11/15/2018     (H) 11/15/2018     11/15/2018     Results for orders placed or performed in visit on 04/05/19   Basic Metabolic Panel   Result Value Ref Range    Sodium 138 136 - 145 mmol/L    Potassium 4.1 3.5 - 5.0 mmol/L    Chloride 103 98 - 107 mmol/L    CO2 30 22 - 31  mmol/L    Anion Gap, Calculation 5 5 - 18 mmol/L    Glucose 84 70 - 125 mg/dL    Calcium 10.1 8.5 - 10.5 mg/dL    BUN 16 8 - 28 mg/dL    Creatinine 0.67 (L) 0.70 - 1.30 mg/dL    GFR MDRD Af Amer >60 >60 mL/min/1.73m2    GFR MDRD Non Af Amer >60 >60 mL/min/1.73m2       Assessment/Plan:  1. General debility. Advanced age, DJD, hx of compression fractures.  2. Chronic pian. Stable on scheduled meds.  3. HTN. On lisinopril and clonidine. Continue to monitor.  4. Afib. Not on warfarin. Has adequate rate control.  5. Hx of prostate cancer. Saw urology in Feb, no new orders. RTC 6 months recommended.          Electronically signed by: Yojana Barahona MD

## 2021-05-30 NOTE — TELEPHONE ENCOUNTER
----- Message from JAS Smith sent at 7/8/2019 12:02 PM CDT -----  Regarding: Device RN Review: Rep to CC/Remotes only  Patient's daughter Sophie called, she can be reached at: [phone].     She states that she received a call from Dr. Donovan whom told her her dad no longer needed to come into the clinic, that he could do remotes only due to difficulty of him coming into clinic. I do not see a note in Epic about this.     I told Sophie that I would forward this to the Device RNs and that one of the Device RNs would call her back about a plan. Thanks! Katerine

## 2021-05-30 NOTE — PROGRESS NOTES
[facility]    Facility:   [facility] [[id number]]   Code Status: DNR/DNI      CHIEF COMPLAINT/REASON FOR VISIT:  Chief Complaint   Patient presents with     Problem Visit     congestion       HISTORY:      HPI: Kelvin is a 89 y.o. male residing at [facility]. He is with history of chronic back pain ( on oxycodone), multiple chronic compression fractures. Hypertension, atrial fibrillation not on coumadin He is new to this facility. He was admitted to [facility] on 8/21/18. He was recently hospitalized on 8/19 for failure to thrive,  weakness and pneumonia      Today he is seen for reports of increased nasal congestion.  He reported his congestion was more upper airway. He had no sinus tenderness, afebrile. He reports the congestion is worse at night. He tells me it is clear. He is able to get it up most of the time but reports it is sometimes thick.   His lungs were clear and he had no lower extremity edema.  He was seen in his room. He denied CP.  He denied HA. His weights have been stable over the last month    Past Medical History:   Diagnosis Date     Aortic stenosis      Blind right eye      Cholelithiasis      Coronary artery disease     atherosclerotic aorta     Diverticular disease      Heart block AV third degree (H)      Heart murmur      Hyperlipidemia      Hypertension      Inguinal hernia     right     L1 vertebral fracture (H) 08/18/2014     Leukocytosis      Pacemaker      Pneumonia      Postoperative atrial fibrillation (H)     Had for few days after pacemaker placement     Prostate cancer (H)     prostate cancer, skin cancer     Syncope 01/2014     T12 vertebral fracture (H) 08/18/2014             Family History   Problem Relation Age of Onset     Heart attack Father      Social History     Socioeconomic History     Marital status:      Spouse name: Not on file     Number of children: Not on file     Years of education: Not on file      Highest education level: Not on file   Occupational History     Not on file   Social Needs     Financial resource strain: Not on file     Food insecurity:     Worry: Not on file     Inability: Not on file     Transportation needs:     Medical: Not on file     Non-medical: Not on file   Tobacco Use     Smoking status: Former Smoker     Years: 10.00     Last attempt to quit: 1951     Years since quittin.6     Smokeless tobacco: Never Used   Substance and Sexual Activity     Alcohol use: No     Alcohol/week: 1.0 oz     Types: 2 Standard drinks or equivalent per week     Comment: rarely     Drug use: No     Sexual activity: Never   Lifestyle     Physical activity:     Days per week: Not on file     Minutes per session: Not on file     Stress: Not on file   Relationships     Social connections:     Talks on phone: Not on file     Gets together: Not on file     Attends Judaism service: Not on file     Active member of club or organization: Not on file     Attends meetings of clubs or organizations: Not on file     Relationship status: Not on file     Intimate partner violence:     Fear of current or ex partner: Not on file     Emotionally abused: Not on file     Physically abused: Not on file     Forced sexual activity: Not on file   Other Topics Concern     Not on file   Social History Narrative    The patient lives in an assisted living facility.          Review of Systems   Constitutional: Positive for fatigue. Negative for activity change, appetite change, chills and fever.   HENT: Positive for congestion and hearing loss. Negative for sore throat.         Wears HA    Eyes: Positive for visual disturbance.        Poor vision    Respiratory: Positive for cough. Negative for shortness of breath and wheezing.    Cardiovascular: Negative for chest pain and leg swelling.   Gastrointestinal: Negative for abdominal distention, abdominal pain, diarrhea and nausea.   Genitourinary: Negative for dysuria.    Musculoskeletal: Negative for arthralgias and myalgias.   Skin: Negative for color change, rash and wound.   Neurological: Positive for weakness. Negative for dizziness and numbness.   Psychiatric/Behavioral: Negative for agitation, behavioral problems, confusion and sleep disturbance.       .  Vitals:    07/25/19 1123   BP: 152/80   Pulse: 66   Resp: 20   Temp: 98.2  F (36.8  C)   SpO2: 97%   Weight: 162 lb 6.4 oz (73.7 kg)       Physical Exam   Constitutional: He is oriented to person, place, and time. He appears well-developed and well-nourished.   HENT:   Head: Normocephalic.   Eyes: Conjunctivae are normal.   Neck: Normal range of motion.   Cardiovascular: Normal rate and regular rhythm.   Murmur heard.  pacemaker   Pulmonary/Chest: No respiratory distress. He has no wheezes.   Abdominal: Soft. Bowel sounds are normal. He exhibits no distension. There is no tenderness.   Musculoskeletal: Normal range of motion. He exhibits no edema.   Neurological: He is alert and oriented to person, place, and time.   Skin: Skin is warm and dry.   Psychiatric: He has a normal mood and affect. His behavior is normal.         LABS:   No results found for this or any previous visit (from the past 240 hour(s)).  Current Outpatient Medications   Medication Sig     acetaminophen (TYLENOL) 325 MG tablet Take 650 mg by mouth 4 (four) times a day. Takes at 0200, 0800, 1400, and 2000.     bisacodyl (DULCOLAX) 10 mg suppository Insert 10 mg into the rectum daily as needed.     calcium, as carbonate, (TUMS) 200 mg calcium (500 mg) chewable tablet Chew 1 tablet 3 (three) times a day as needed for heartburn.     cloNIDine HCl (CATAPRES) 0.2 MG tablet Take 0.2 mg by mouth 2 (two) times a day.     leuprolide (LUPRON) 30 mg injection Inject 30 mg into the shoulder, thigh, or buttocks every 4 (four) months.     lisinopril (PRINIVIL,ZESTRIL) 5 MG tablet Take 40 mg by mouth daily.            oxyCODONE (ROXICODONE) 5 MG immediate release tablet  Take 0.5 tablets (2.5 mg total) by mouth 3 (three) times a day. Takes at 0800, 1400, and 2000     oxyCODONE (ROXICODONE) 5 MG immediate release tablet Take 0.5 tablets (2.5 mg total) by mouth 2 (two) times a day as needed for pain.     senna-docusate (PERICOLACE) 8.6-50 mg tablet Take 2 tablets by mouth 2 (two) times a day.     ASSESSMENT:      ICD-10-CM    1. Congestion of throat R68.89        PLAN:    Hypertension-lisinopril to 40 mg daily, increase clonidine0.3 two times a day.   Atrial fibrillation - not on coumadin   Cough-clear sputum, afebrile, mucinex two times a day x 5 days and staff to update provider if no relief or cough worsens.   Electronically signed by: Michelle Mitchell CNP

## 2021-06-01 VITALS — HEIGHT: 66 IN | WEIGHT: 160 LBS | BODY MASS INDEX: 25.71 KG/M2

## 2021-06-01 NOTE — PROGRESS NOTES
[facility]    Facility:   [facility] [place] [facility] [[id number]]   Code Status: DNR/DNI      CHIEF COMPLAINT/REASON FOR VISIT:  Chief Complaint   Patient presents with     FVP Care Coordination - Regulatory       HISTORY:      HPI: Kelvin is a 89 y.o. male  residing at [facility]. He is with history of chronic back pain ( on oxycodone), multiple chronic compression fractures. Hypertension, atrial fibrillation not on coumadin  He was admitted to [facility] on 8/21/18. He was recently hospitalized on 8/19 for failure to thrive,  weakness and pneumonia       Today he is seen for  a routine visit to review multiple medical issues. . He denied CP.  He denied HA. His weights have been stable over the last month. His BP's are noted to be elevated and clonidine was increased. He does have a chronic intermittent cough with congestion. He tells me his secretions are clear.  He denied nasal congestion. He is moving his bowels and had no issues with urination.      Past Medical History:   Diagnosis Date     Aortic stenosis      Blind right eye      Cholelithiasis      Coronary artery disease     atherosclerotic aorta     Diverticular disease      Heart block AV third degree (H)      Heart murmur      Hyperlipidemia      Hypertension      Inguinal hernia     right     L1 vertebral fracture (H) 08/18/2014     Leukocytosis      Pacemaker      Pneumonia      Postoperative atrial fibrillation (H)     Had for few days after pacemaker placement     Prostate cancer (H)     prostate cancer, skin cancer     Syncope 01/2014     T12 vertebral fracture (H) 08/18/2014             Family History   Problem Relation Age of Onset     Heart attack Father      Social History     Socioeconomic History     Marital status:      Spouse name: Not on file     Number of children: Not on file     Years of education: Not on file     Highest education level: Not on file   Occupational History     Not on file    Social Needs     Financial resource strain: Not on file     Food insecurity:     Worry: Not on file     Inability: Not on file     Transportation needs:     Medical: Not on file     Non-medical: Not on file   Tobacco Use     Smoking status: Former Smoker     Years: 10.00     Last attempt to quit: 1951     Years since quittin.7     Smokeless tobacco: Never Used   Substance and Sexual Activity     Alcohol use: No     Alcohol/week: 1.0 oz     Types: 2 Standard drinks or equivalent per week     Comment: rarely     Drug use: No     Sexual activity: Never   Lifestyle     Physical activity:     Days per week: Not on file     Minutes per session: Not on file     Stress: Not on file   Relationships     Social connections:     Talks on phone: Not on file     Gets together: Not on file     Attends Mosque service: Not on file     Active member of club or organization: Not on file     Attends meetings of clubs or organizations: Not on file     Relationship status: Not on file     Intimate partner violence:     Fear of current or ex partner: Not on file     Emotionally abused: Not on file     Physically abused: Not on file     Forced sexual activity: Not on file   Other Topics Concern     Not on file   Social History Narrative    The patient lives in an assisted living facility.          Review of Systems  Constitutional: Positive for fatigue. Negative for activity change, appetite change, chills and fever.   HENT: Positive for congestion and hearing loss. Negative for sore throat.         Wears HA    Eyes: Positive for visual disturbance.        Poor vision    Respiratory: Positive for cough. Negative for shortness of breath and wheezing.    Cardiovascular: Negative for chest pain and leg swelling.   Gastrointestinal: Negative for abdominal distention, abdominal pain, diarrhea and nausea.   Genitourinary: Negative for dysuria.   Musculoskeletal: Negative for arthralgias and myalgias.   Skin: Negative for color  change, rash and wound.   Neurological: Positive for weakness. Negative for dizziness and numbness.   Psychiatric/Behavioral: Negative for agitation, behavioral problems, confusion and sleep disturbance. .  Vitals:    09/11/19 1035   BP: (!) 159/95   Pulse: 62   Resp: 20   Temp: 98  F (36.7  C)   SpO2: 99%   Weight: 164 lb (74.4 kg)       Physical Exam   Pulmonary/Chest: He has rales.   Bilateral bases.        Constitutional: He is oriented to person, place, and time. He appears well-developed and well-nourished.   HENT:   Head: Normocephalic.   Eyes: Conjunctivae are normal.   Neck: Normal range of motion.   Cardiovascular: Normal rate and regular rhythm.   Murmur heard.  pacemaker   Pulmonary/Chest: No respiratory distress. He has no wheezes.   Abdominal: Soft. Bowel sounds are normal. He exhibits no distension. There is no tenderness.   Musculoskeletal: Normal range of motion. He exhibits no edema.   Neurological: He is alert and oriented to person, place, and time.   Skin: Skin is warm and dry.   Psychiatric: He has a normal mood and affect. His behavior is normal.      LABS:   No results found for this or any previous visit (from the past 240 hour(s)).  Case Management:  I have reviewed the facility/SNF care plan/MDS which was done 8/7/19, including the falls risk, nutrition and pain screening. I also reviewed the current immunizations, and preventive care.. Future cancer screening is not clinically indicated secondary to age/goals of care.   Patient's desire to return to the community is not present.  Current Outpatient Medications   Medication Sig     acetaminophen (TYLENOL) 325 MG tablet Take 650 mg by mouth 4 (four) times a day. Takes at 0200, 0800, 1400, and 2000.     bisacodyl (DULCOLAX) 10 mg suppository Insert 10 mg into the rectum daily as needed.     calcium, as carbonate, (TUMS) 200 mg calcium (500 mg) chewable tablet Chew 1 tablet 3 (three) times a day as needed for heartburn.     cloNIDine HCl  (CATAPRES) 0.2 MG tablet Take 0.4 mg by mouth 2 (two) times a day.            lisinopril (PRINIVIL,ZESTRIL) 5 MG tablet Take 40 mg by mouth daily.            mineral oil, bulk, Oil Use 5 drops As Directed. Both ears at HS every Tuesday for cerumen buildup     senna-docusate (PERICOLACE) 8.6-50 mg tablet Take 2 tablets by mouth 2 (two) times a day.     Information reviewed:  Medications, vital signs, orders, and nursing notes.    ASSESSMENT:      ICD-10-CM    1. Debility R53.81    2. Essential hypertension I10    3. Atrial fibrillation, unspecified type (H) I48.91    4. Other chronic pain G89.29        PLAN:    Hypertension-lisinopril 40 mg daily, increase clonidine to 0.4 two times a day.     Atrial fibrillation - not on coumadin     Chronic pain continue Tylenol    Cough chronic reports clear secretions       Electronically signed by: Michelle Mitchell CNP

## 2021-06-02 VITALS — WEIGHT: 162 LBS | BODY MASS INDEX: 26.15 KG/M2

## 2021-06-02 VITALS — BODY MASS INDEX: 25.73 KG/M2 | WEIGHT: 159.4 LBS

## 2021-06-02 VITALS — BODY MASS INDEX: 25.95 KG/M2 | WEIGHT: 160.8 LBS

## 2021-06-02 VITALS — WEIGHT: 157 LBS | BODY MASS INDEX: 25.34 KG/M2

## 2021-06-02 NOTE — PROGRESS NOTES
[facility]    Facility:   [facility] [[id number]]   Code Status: DNR/DNI       Chief Complaint   Patient presents with     Review Of Multiple Medical Conditions     [facility] 10/31/19.       HPI:   Kelvin is a 89 y.o. male with hx of HTN, chronic pain, compression fractures, prostate cancer, seen for routine physician follow up in LT. He was hospitalized Aug 2018 for concern of pneumonia. Hx of multiple compression fractures, weakness and FTT. Ultimately admitted to LT here at [facility].      Today:  He has been pretty stable. Has chronic pain for which he is on scheduled oxycodone and acetaminophen. Appears adequately controlled, no need to make changes. He is treated for HTN with lisinopril and clonidine. He denies headaches, dizziness or palpitations. He denies cough today. No shortness of breath or chest pain. No fever reported. He has not had any medication changes. No concerns per nursing. He is hard of hearing, likes to spend time in his room reading but also goes to some activities. He propels himself in his wheelchair.       Past Medical History:  Past Medical History:   Diagnosis Date     Aortic stenosis      Blind right eye      Cholelithiasis      Coronary artery disease     atherosclerotic aorta     Diverticular disease      Heart block AV third degree (H)      Heart murmur      Hyperlipidemia      Hypertension      Inguinal hernia     right     L1 vertebral fracture (H) 08/18/2014     Leukocytosis      Pacemaker      Pneumonia      Postoperative atrial fibrillation (H)     Had for few days after pacemaker placement     Prostate cancer (H)     prostate cancer, skin cancer     Syncope 01/2014     T12 vertebral fracture (H) 08/18/2014       Medications:  Current Outpatient Medications   Medication Sig     acetaminophen (TYLENOL) 325 MG tablet Take 650 mg by mouth 4 (four) times a day. Takes at 0200, 0800, 1400, and 2000.     bisacodyl (DULCOLAX)  10 mg suppository Insert 10 mg into the rectum daily as needed.     calcium, as carbonate, (TUMS) 200 mg calcium (500 mg) chewable tablet Chew 1 tablet 3 (three) times a day as needed for heartburn.     cloNIDine HCl (CATAPRES) 0.2 MG tablet Take 0.4 mg by mouth 2 (two) times a day.            lisinopril (PRINIVIL,ZESTRIL) 5 MG tablet Take 40 mg by mouth daily.            mineral oil, bulk, Oil Use 5 drops As Directed. Both ears at HS every Tuesday for cerumen buildup     senna-docusate (PERICOLACE) 8.6-50 mg tablet Take 2 tablets by mouth 2 (two) times a day.       Physical Exam:   General: Patient is alert, elderly male, [place], no distress.   Vitals: /78, Temp 98.5, Pulse 62, RR 20, O2 sat 95% RA  HEENT: Head is NCAT. Eyes show no injection or icterus. Nares negative. Oropharynx well hydrated.  Neck: Supple. No tenderness or adenopathy. No JVD.  Lungs: Diminished at bases. No wheezes.  Cardiovascular: Regular rate and rhythm, systolic murmur.  Back: No spinal or CVA tenderness.  Abdomen: Soft, no tenderness on exam. Bowel sounds present. No guarding rebound or rigidity.  Extremities: No LE edema.  Musculoskeletal: Age related degen changes.  Skin: Warm and dry.  Psych: Mood appears pretty good.      Labs:  Lab Results   Component Value Date    WBC 11.0 11/15/2018    HGB 12.6 (L) 11/15/2018    HCT 39.9 (L) 11/15/2018     (H) 11/15/2018     11/15/2018     Results for orders placed or performed in visit on 04/05/19   Basic Metabolic Panel   Result Value Ref Range    Sodium 138 136 - 145 mmol/L    Potassium 4.1 3.5 - 5.0 mmol/L    Chloride 103 98 - 107 mmol/L    CO2 30 22 - 31 mmol/L    Anion Gap, Calculation 5 5 - 18 mmol/L    Glucose 84 70 - 125 mg/dL    Calcium 10.1 8.5 - 10.5 mg/dL    BUN 16 8 - 28 mg/dL    Creatinine 0.67 (L) 0.70 - 1.30 mg/dL    GFR MDRD Af Amer >60 >60 mL/min/1.73m2    GFR MDRD Non Af Amer >60 >60 mL/min/1.73m2         Assessment/Plan:  1. Debilitation. Advanced age, DJD, hx  of compression fractures.  2. Chronic pian. Stable on scheduled meds, continue as written.  3. HTN. Overall acceptable, Continue lisinopril and clonidine.   4. Afib. Not on warfarin. Adequate rate control.  5. Hx of prostate cancer. Saw urology in Aug, no new orders. RTC 6 months recommended.        Electronically signed by: Yojana Barahona MD

## 2021-06-03 VITALS
DIASTOLIC BLOOD PRESSURE: 95 MMHG | HEART RATE: 62 BPM | BODY MASS INDEX: 26.47 KG/M2 | RESPIRATION RATE: 20 BRPM | TEMPERATURE: 98 F | SYSTOLIC BLOOD PRESSURE: 159 MMHG | WEIGHT: 164 LBS | OXYGEN SATURATION: 99 %

## 2021-06-03 VITALS — BODY MASS INDEX: 26.21 KG/M2 | WEIGHT: 162.4 LBS

## 2021-06-04 VITALS
BODY MASS INDEX: 27.44 KG/M2 | HEART RATE: 62 BPM | SYSTOLIC BLOOD PRESSURE: 139 MMHG | OXYGEN SATURATION: 91 % | TEMPERATURE: 99 F | WEIGHT: 170 LBS | DIASTOLIC BLOOD PRESSURE: 79 MMHG | RESPIRATION RATE: 20 BRPM

## 2021-06-04 VITALS
DIASTOLIC BLOOD PRESSURE: 96 MMHG | HEART RATE: 60 BPM | RESPIRATION RATE: 20 BRPM | BODY MASS INDEX: 27.44 KG/M2 | OXYGEN SATURATION: 92 % | SYSTOLIC BLOOD PRESSURE: 190 MMHG | WEIGHT: 170 LBS | TEMPERATURE: 98.2 F

## 2021-06-04 VITALS
SYSTOLIC BLOOD PRESSURE: 124 MMHG | WEIGHT: 170.2 LBS | BODY MASS INDEX: 27.47 KG/M2 | TEMPERATURE: 98.4 F | OXYGEN SATURATION: 94 % | RESPIRATION RATE: 20 BRPM | DIASTOLIC BLOOD PRESSURE: 68 MMHG | HEART RATE: 74 BPM

## 2021-06-04 VITALS
BODY MASS INDEX: 27.44 KG/M2 | SYSTOLIC BLOOD PRESSURE: 146 MMHG | HEART RATE: 100 BPM | WEIGHT: 170 LBS | TEMPERATURE: 99.7 F | DIASTOLIC BLOOD PRESSURE: 68 MMHG | RESPIRATION RATE: 20 BRPM | OXYGEN SATURATION: 92 %

## 2021-06-04 NOTE — PROGRESS NOTES
Type: pacemaker alert remote transmission for AT/AF burden for 6/24hrs.   Presenting rhythm: A-V paced and AS- 60-70 bpm.  Battery/lead status: stable.  Arrhythmias: since 9/24/19; one mode switch episode ~14hrs, EGM confirms AF, v-rates >/=120bpm 5%, AF burden <1%. No ventricular arrhythmias detected.  Anticoagulant: None.  Comments: Normal magnet and pacemaker function. Routed to device RN for review. [facility]HA       Transmission reviewed, agree with above. Recent episode of Afib lasting ~14 hours., good v-rates. Overall low AF burden and not on anticoagulation. Chart review shows patient had recent fever, cough, crackles which may have provoked A.fib episode. Now back in NSR with appropriate pacing.  Patient is on remote only pacemaker checks, does not come into [facility] anymore.     Will update PMD and continue routine monitoring.    Christianne Arreaga, RN

## 2021-06-04 NOTE — PROGRESS NOTES
[facility]    Facility:   [facility] [place] [facility] [[id number]]   Code Status: DNR      CHIEF COMPLAINT/REASON FOR VISIT:  Chief Complaint   Patient presents with     Problem Visit     cough, fever, crackles        HISTORY:      HPI: Kelvin is a 89 y.o. male  residing at [facility]. He is with history of chronic back pain ( on Tylenol), multiple chronic compression fractures. Hypertension, atrial fibrillation not on coumadin       Today he is seen for  reports of fever, cough and crackles.  He was found to have crackles throughout all fields and a temp of 99.7 however over the weekend he was 100.3.  He reports the fever started started over the weekend. He has alway had a chronic cough and multiple modalities were initiated along with previous chest x-rays.  His lisinopril was also changed due to chronic cough.   His weights have been stable over the last month.   He is moving his bowels and had no issues with urination. He denied pain and had no lower extremity edema         Past Medical History:   Diagnosis Date     Aortic stenosis      Blind right eye      Cholelithiasis      Coronary artery disease     atherosclerotic aorta     Diverticular disease      Heart block AV third degree (H)      Heart murmur      Hyperlipidemia      Hypertension      Inguinal hernia     right     L1 vertebral fracture (H) 08/18/2014     Leukocytosis      Pacemaker      Pneumonia      Postoperative atrial fibrillation (H)     Had for few days after pacemaker placement     Prostate cancer (H)     prostate cancer, skin cancer     Syncope 01/2014     T12 vertebral fracture (H) 08/18/2014             Family History   Problem Relation Age of Onset     Heart attack Father      Social History     Socioeconomic History     Marital status:      Spouse name: Not on file     Number of children: Not on file     Years of education: Not on file     Highest education level: Not on file    Occupational History     Not on file   Social Needs     Financial resource strain: Not on file     Food insecurity:     Worry: Not on file     Inability: Not on file     Transportation needs:     Medical: Not on file     Non-medical: Not on file   Tobacco Use     Smoking status: Former Smoker     Years: 10.00     Last attempt to quit: 1951     Years since quittin.0     Smokeless tobacco: Never Used   Substance and Sexual Activity     Alcohol use: No     Alcohol/week: 1.7 standard drinks     Types: 2 Standard drinks or equivalent per week     Comment: rarely     Drug use: No     Sexual activity: Never   Lifestyle     Physical activity:     Days per week: Not on file     Minutes per session: Not on file     Stress: Not on file   Relationships     Social connections:     Talks on phone: Not on file     Gets together: Not on file     Attends Nondenominational service: Not on file     Active member of club or organization: Not on file     Attends meetings of clubs or organizations: Not on file     Relationship status: Not on file     Intimate partner violence:     Fear of current or ex partner: Not on file     Emotionally abused: Not on file     Physically abused: Not on file     Forced sexual activity: Not on file   Other Topics Concern     Not on file   Social History Narrative    The patient lives in an assisted living facility.          Review of Systems   Constitutional: Positive for fatigue and fever.     Constitutional: Positive for fatigue. Negative for activity change, appetite change, chills and fever.   HENT: Positive for congestion and hearing loss. Negative for sore throat.         Wears HA    Eyes: Positive for visual disturbance.        Poor vision    Respiratory: Positive for cough. Negative for shortness of breath and wheezing.    Cardiovascular: Negative for chest pain and leg swelling.   Gastrointestinal: Negative for abdominal distention, abdominal pain, diarrhea and nausea.   Genitourinary:  Negative for dysuria.   Musculoskeletal: Negative for arthralgias and myalgias.   Skin: Negative for color change, rash and wound.   Neurological: Positive for weakness. Negative for dizziness and numbness.   Psychiatric/Behavioral: Negative for agitation, behavioral problems, confusion and sleep disturbance. .  Vitals:    12/16/19 1135   BP: 146/68   Pulse: 100   Resp: 20   Temp: 99.7  F (37.6  C)   SpO2: 92%   Weight: 170 lb (77.1 kg)       Physical Exam  Pulmonary:      Breath sounds: Rales present.      Comments: Bilateral lower lobes      Constitutional: He is oriented to person, place, and time. He appears well-developed and well-nourished.   HENT:   Head: Normocephalic.   Eyes: Conjunctivae are normal.   Neck: Normal range of motion.   Cardiovascular: Normal rate and regular rhythm.   Murmur heard.  pacemaker   Pulmonary/Chest: No respiratory distress. He has no wheezes.   Abdominal: Soft. Bowel sounds are normal. He exhibits no distension. There is no tenderness.   Musculoskeletal: Normal range of motion. He exhibits no edema.   Neurological: He is alert and oriented to person, place, and time.   Skin: Skin is warm and dry.   Psychiatric: He has a normal mood and affect. His behavior is normal.      LABS:   Recent Results (from the past 240 hour(s))   HM2(CBC w/o Differential)   Result Value Ref Range    WBC 15.4 (H) 4.0 - 11.0 thou/uL    RBC 3.79 (L) 4.40 - 6.20 mill/uL    Hemoglobin 12.1 (L) 14.0 - 18.0 g/dL    Hematocrit 37.2 (L) 40.0 - 54.0 %    MCV 98 80 - 100 fL    MCH 31.9 27.0 - 34.0 pg    MCHC 32.5 32.0 - 36.0 g/dL    RDW 12.6 11.0 - 14.5 %    Platelets 208 140 - 440 thou/uL    MPV 10.4 8.5 - 12.5 fL        Current Outpatient Medications   Medication Sig     metoprolol succinate (TOPROL-XL) 25 MG Take 25 mg by mouth daily.     acetaminophen (TYLENOL) 325 MG tablet Take 650 mg by mouth 4 (four) times a day. Takes at 0200, 0800, 1400, and 2000.     bisacodyl (DULCOLAX) 10 mg suppository Insert 10 mg  into the rectum daily as needed.     calcium carbonate-vitamin D3 (CALCIUM 600 WITH VITAMIN D3) 600 mg(1,500mg) -400 unit cap Take 1 tablet by mouth daily.     calcium, as carbonate, (TUMS) 200 mg calcium (500 mg) chewable tablet Chew 1 tablet 3 (three) times a day as needed for heartburn.     cloNIDine HCl (CATAPRES) 0.2 MG tablet Take 0.4 mg by mouth 2 (two) times a day.            senna-docusate (PERICOLACE) 8.6-50 mg tablet Take 2 tablets by mouth 2 (two) times a day.       ASSESSMENT:      ICD-10-CM    1. Fever, unspecified fever cause R50.9    2. Bibasilar crackles R09.89    3. Cough R05        PLAN:    Cough, crackles, fever- Chest Xray A/P lateral and CBC    Hypertension metoprolol succinate 25 mg daily.   continue clonidine to 0.4 two times a day.      Atrial fibrillation - not on coumadin      Chronic pain continue Tylenol         Electronically signed by: Michelle Mitchell CNP

## 2021-06-04 NOTE — PROGRESS NOTES
[facility]    Facility:   [facility] [[id number]]   Code Status: DNR      CHIEF COMPLAINT/REASON FOR VISIT:  Chief Complaint   Patient presents with     FVP Care Coordination - Regulatory       HISTORY:      HPI: Kelvin is a 89 y.o. male  residing at [facility]. He is with history of chronic back pain ( on Tylenol), multiple chronic compression fractures. Hypertension, atrial fibrillation not on coumadin  He was admitted to [facility] on 8/21/18. He was recently hospitalized on 8/19 for failure to thrive,  weakness and pneumonia       Today he is seen for  a routine visit to review multiple medical issues. . He denied CP.  He denied HA. His weights have been stable over the last month. His BP's are noted to be elevated and he is on clonidine 0.4 mg 2 times a day he was also on lisinopril 40 mg daily which has been DC'd due to a chronic cough that he has been having unresolved from other interventions.  I did start him on metoprolol succinate 25 mg daily and close monitoring of blood his blood pressures x1 week with parameters as to when to call provider..  He is moving his bowels and had no issues with urination.    I was able to see his secretions today and they were clear.  Was noted to have crackles left lower lobe.    Past Medical History:   Diagnosis Date     Aortic stenosis      Blind right eye      Cholelithiasis      Coronary artery disease     atherosclerotic aorta     Diverticular disease      Heart block AV third degree (H)      Heart murmur      Hyperlipidemia      Hypertension      Inguinal hernia     right     L1 vertebral fracture (H) 08/18/2014     Leukocytosis      Pacemaker      Pneumonia      Postoperative atrial fibrillation (H)     Had for few days after pacemaker placement     Prostate cancer (H)     prostate cancer, skin cancer     Syncope 01/2014     T12 vertebral fracture (H) 08/18/2014             Family History   Problem Relation Age  of Onset     Heart attack Father      Social History     Socioeconomic History     Marital status:      Spouse name: Not on file     Number of children: Not on file     Years of education: Not on file     Highest education level: Not on file   Occupational History     Not on file   Social Needs     Financial resource strain: Not on file     Food insecurity:     Worry: Not on file     Inability: Not on file     Transportation needs:     Medical: Not on file     Non-medical: Not on file   Tobacco Use     Smoking status: Former Smoker     Years: 10.00     Last attempt to quit: 1951     Years since quittin.9     Smokeless tobacco: Never Used   Substance and Sexual Activity     Alcohol use: No     Alcohol/week: 1.7 standard drinks     Types: 2 Standard drinks or equivalent per week     Comment: rarely     Drug use: No     Sexual activity: Never   Lifestyle     Physical activity:     Days per week: Not on file     Minutes per session: Not on file     Stress: Not on file   Relationships     Social connections:     Talks on phone: Not on file     Gets together: Not on file     Attends Oriental orthodox service: Not on file     Active member of club or organization: Not on file     Attends meetings of clubs or organizations: Not on file     Relationship status: Not on file     Intimate partner violence:     Fear of current or ex partner: Not on file     Emotionally abused: Not on file     Physically abused: Not on file     Forced sexual activity: Not on file   Other Topics Concern     Not on file   Social History Narrative    The patient lives in an assisted living facility.          Review of Systems  Constitutional: Positive for fatigue. Negative for activity change, appetite change, chills and fever.   HENT: Positive for congestion and hearing loss. Negative for sore throat.         Wears HA    Eyes: Positive for visual disturbance.        Poor vision    Respiratory: Positive for cough. Negative for shortness of  breath and wheezing.    Cardiovascular: Negative for chest pain and leg swelling.   Gastrointestinal: Negative for abdominal distention, abdominal pain, diarrhea and nausea.   Genitourinary: Negative for dysuria.   Musculoskeletal: Negative for arthralgias and myalgias.   Skin: Negative for color change, rash and wound.   Neurological: Positive for weakness. Negative for dizziness and numbness.   Psychiatric/Behavioral: Negative for agitation, behavioral problems, confusion and sleep disturbance. .  Vitals:    12/06/19 1031   BP: (!) 190/96   Pulse: 60   Resp: 20   Temp: 98.2  F (36.8  C)   SpO2: 92%   Weight: 170 lb (77.1 kg)       Physical Exam    Pulmonary/Chest: He has rales.   Left lower lobe  Constitutional: He is oriented to person, place, and time. He appears well-developed and well-nourished.   HENT:   Head: Normocephalic.   Eyes: Conjunctivae are normal.   Neck: Normal range of motion.   Cardiovascular: Normal rate and regular rhythm.   Murmur heard.  pacemaker   Pulmonary/Chest: No respiratory distress. He has no wheezes.   Abdominal: Soft. Bowel sounds are normal. He exhibits no distension. There is no tenderness.   Musculoskeletal: Normal range of motion. He exhibits no edema.   Neurological: He is alert and oriented to person, place, and time.   Skin: Skin is warm and dry.   Psychiatric: He has a normal mood and affect. His behavior is normal.      LABS:   No results found for this or any previous visit (from the past 240 hour(s)).     Current Outpatient Medications   Medication Sig     acetaminophen (TYLENOL) 325 MG tablet Take 650 mg by mouth 4 (four) times a day. Takes at 0200, 0800, 1400, and 2000.     bisacodyl (DULCOLAX) 10 mg suppository Insert 10 mg into the rectum daily as needed.     calcium carbonate-vitamin D3 (CALCIUM 600 WITH VITAMIN D3) 600 mg(1,500mg) -400 unit cap Take 1 tablet by mouth daily.     calcium, as carbonate, (TUMS) 200 mg calcium (500 mg) chewable tablet Chew 1 tablet 3  (three) times a day as needed for heartburn.     cloNIDine HCl (CATAPRES) 0.2 MG tablet Take 0.4 mg by mouth 2 (two) times a day.            lisinopril (PRINIVIL,ZESTRIL) 5 MG tablet Take 40 mg by mouth daily.            senna-docusate (PERICOLACE) 8.6-50 mg tablet Take 2 tablets by mouth 2 (two) times a day.     Case Management:  I have reviewed the facility/SNF care plan/MDS which was done 10/19/19, including the falls risk, nutrition and pain screening. I also reviewed the current immunizations, and preventive care.. Future cancer screening is not clinically indicated secondary to age/goals of care.   Patient's desire to return to the community is not assessible due to cognitive impairment.    Information reviewed:  Medications, vital signs, orders, and nursing notes.    ASSESSMENT:      ICD-10-CM    1. Chronic pain syndrome G89.4    2. Atrial fibrillation, unspecified type (H) I48.91    3. Congestion of throat R68.89        PLAN:    Hypertension-lisinopril discontinued due to her chronic cough unrelieved by other interventions and patient started on metoprolol succinate 25 mg daily with close monitoring of his blood pressures also continue clonidine to 0.4 two times a day.      Atrial fibrillation - not on coumadin      Chronic pain continue Tylenol     Cough chronic reports clear secretions lisinopril discontinued and metoprolol succinate started.  Other interventions were tried in the past with no relief.        Electronically signed by: Michelle Mitchell CNP

## 2021-06-05 ENCOUNTER — RECORDS - HEALTHEAST (OUTPATIENT)
Dept: SCHEDULING | Facility: CLINIC | Age: 86
End: 2021-06-05

## 2021-06-05 VITALS
RESPIRATION RATE: 18 BRPM | WEIGHT: 167.5 LBS | OXYGEN SATURATION: 93 % | TEMPERATURE: 98.7 F | DIASTOLIC BLOOD PRESSURE: 84 MMHG | BODY MASS INDEX: 27.04 KG/M2 | HEART RATE: 61 BPM | SYSTOLIC BLOOD PRESSURE: 150 MMHG

## 2021-06-05 DIAGNOSIS — M81.0 OSTEOPOROSIS: ICD-10-CM

## 2021-06-06 NOTE — PROGRESS NOTES
[facility]    Facility:   [facility] [[id number]]   Code Status: DNR/DNI       Chief Complaint   Patient presents with     Review Of Multiple Medical Conditions     LT 2/25/20.       HPI:   Kelvin is a 89 y.o. male with hx of HTN, chronic pain, compression fractures, prostate cancer, seen for routine physician follow up in LT. He was hospitalized Aug 2018 for concern of pneumonia. Hx of multiple compression fractures, weakness and FTT. Ultimately admitted to LT here at [facility].      Today:  No interim concerns since my last visit. He has been fairly stable per nursing report. Has chronic pain for which he is on scheduled oxycodone and acetaminophen. Appears adequately controlled. He is treated for HTN with metoprolol and clonidine. He denies headaches, dizziness or palpitations. He denies cough today. No shortness of breath or chest pain. No fever reported. He has not had any medication changes. He is hard of hearing, likes to spend time in his room reading but also goes to some activities. He propels himself in his wheelchair.       Past Medical History:  Past Medical History:   Diagnosis Date     Aortic stenosis      Blind right eye      Cholelithiasis      Coronary artery disease     atherosclerotic aorta     Diverticular disease      Heart block AV third degree (H)      Heart murmur      Hyperlipidemia      Hypertension      Inguinal hernia     right     L1 vertebral fracture (H) 08/18/2014     Leukocytosis      Pacemaker      Pneumonia      Postoperative atrial fibrillation (H)     Had for few days after pacemaker placement     Prostate cancer (H)     prostate cancer, skin cancer     Syncope 01/2014     T12 vertebral fracture (H) 08/18/2014       Medications:  Current Outpatient Medications   Medication Sig     acetaminophen (TYLENOL) 325 MG tablet Take 650 mg by mouth 4 (four) times a day. Takes at 0200, 0800, 1400, and 2000.     bisacodyl  (DULCOLAX) 10 mg suppository Insert 10 mg into the rectum daily as needed.     calcium carbonate-vitamin D3 (CALCIUM 600 WITH VITAMIN D3) 600 mg(1,500mg) -400 unit cap Take 1 tablet by mouth daily.     calcium, as carbonate, (TUMS) 200 mg calcium (500 mg) chewable tablet Chew 1 tablet 3 (three) times a day as needed for heartburn.     cloNIDine HCl (CATAPRES) 0.2 MG tablet Take 0.4 mg by mouth 2 (two) times a day.            metoprolol succinate (TOPROL-XL) 25 MG Take 25 mg by mouth daily.     senna-docusate (PERICOLACE) 8.6-50 mg tablet Take 2 tablets by mouth 2 (two) times a day.       Physical Exam:   General: Patient is alert, elderly male, Native, no distress.   Vitals: /76, Temp 98.3, Pulse 64, RR 18.  HEENT: Head is NCAT. Eyes show no injection or icterus. Nares negative. Oropharynx well hydrated.  Neck: Supple. No tenderness or adenopathy. No JVD.  Lungs: Diminished at bases. No wheezes.  Cardiovascular: Regular rate and rhythm, systolic murmur.  Back: No spinal or CVA tenderness.  Abdomen: Soft, no tenderness on exam. Bowel sounds present. No guarding rebound or rigidity.  Extremities: No LE edema.  Musculoskeletal: Age related degen changes.  Skin: Warm and dry.  Psych: Mood appears pretty good.      Labs:  Lab Results   Component Value Date    WBC 15.4 (H) 12/16/2019    HGB 12.1 (L) 12/16/2019    HCT 37.2 (L) 12/16/2019    MCV 98 12/16/2019     12/16/2019     Results for orders placed or performed in visit on 04/05/19   Basic Metabolic Panel   Result Value Ref Range    Sodium 138 136 - 145 mmol/L    Potassium 4.1 3.5 - 5.0 mmol/L    Chloride 103 98 - 107 mmol/L    CO2 30 22 - 31 mmol/L    Anion Gap, Calculation 5 5 - 18 mmol/L    Glucose 84 70 - 125 mg/dL    Calcium 10.1 8.5 - 10.5 mg/dL    BUN 16 8 - 28 mg/dL    Creatinine 0.67 (L) 0.70 - 1.30 mg/dL    GFR MDRD Af Amer >60 >60 mL/min/1.73m2    GFR MDRD Non Af Amer >60 >60 mL/min/1.73m2       Assessment/Plan:  1. Debilitation. Advanced age, DJD,  hx of compression fractures. Requires assist from LTC staff.   2. Chronic pain. Stable on scheduled meds, continue as written.  3. HTN. Overall acceptable, Continue metoprolol and clonidine.   4. Afib. Not on warfarin. Adequate rate control.  5. Hx of prostate cancer. Sees urology.           Electronically signed by: Yojana Barahona MD

## 2021-06-07 NOTE — TELEPHONE ENCOUNTER
Last letter sent out indicated next device check would be an in clinic check. Per note in [facility] mor[facility] per Dr. Donovan remote checks only.    Next remote device check scheduled for 7/6/2020. Called patient's daughter, Sophie, informed of change. Sophie verbalized understanding and agrees with plan.

## 2021-06-07 NOTE — PROGRESS NOTES
[facility]    Facility:   [facility] [[id number]]   Code Status: DNR      CHIEF COMPLAINT/REASON FOR VISIT:  Chief Complaint   Patient presents with     FVP Care Coordination - Regulatory       HISTORY:      HPI: Kelvin is a 89 y.o. male  residing at [facility]. He is with history of chronic back pain ( on Tylenol), multiple chronic compression fractures. Hypertension, atrial fibrillation not on coumadin       Today he is seen for  a routine regulatory visit. He denied any discomfort.  He continues to have intermittent upper airway congestion and nasal congestion despite multiple interventions.  Today he was started on allergy medication.  He denied CP.  He denied HA. His weights have been stable over the last month. His BP's are stable and he is on clonidine 0.4 mg 2 times a day.    He is moving his bowels and had no issues with urination.     Past Medical History:   Diagnosis Date     Aortic stenosis      Blind right eye      Cholelithiasis      Coronary artery disease     atherosclerotic aorta     Diverticular disease      Heart block AV third degree (H)      Heart murmur      Hyperlipidemia      Hypertension      Inguinal hernia     right     L1 vertebral fracture (H) 08/18/2014     Leukocytosis      Pacemaker      Pneumonia      Postoperative atrial fibrillation (H)     Had for few days after pacemaker placement     Prostate cancer (H)     prostate cancer, skin cancer     Syncope 01/2014     T12 vertebral fracture (H) 08/18/2014             Family History   Problem Relation Age of Onset     Heart attack Father      Social History     Socioeconomic History     Marital status:      Spouse name: Not on file     Number of children: Not on file     Years of education: Not on file     Highest education level: Not on file   Occupational History     Not on file   Social Needs     Financial resource strain: Not on file     Food insecurity      Worry: Not on file     Inability: Not on file     Transportation needs     Medical: Not on file     Non-medical: Not on file   Tobacco Use     Smoking status: Former Smoker     Years: 10.00     Last attempt to quit: 1951     Years since quittin.3     Smokeless tobacco: Never Used   Substance and Sexual Activity     Alcohol use: No     Alcohol/week: 1.7 standard drinks     Types: 2 Standard drinks or equivalent per week     Comment: rarely     Drug use: No     Sexual activity: Never   Lifestyle     Physical activity     Days per week: Not on file     Minutes per session: Not on file     Stress: Not on file   Relationships     Social connections     Talks on phone: Not on file     Gets together: Not on file     Attends Yazidism service: Not on file     Active member of club or organization: Not on file     Attends meetings of clubs or organizations: Not on file     Relationship status: Not on file     Intimate partner violence     Fear of current or ex partner: Not on file     Emotionally abused: Not on file     Physically abused: Not on file     Forced sexual activity: Not on file   Other Topics Concern     Not on file   Social History Narrative    The patient lives in an assisted living facility.          Review of Systems  Constitutional: Positive for fatigue. Negative for activity change, appetite change, chills and fever.   HENT: Positive for congestion and hearing loss. Negative for sore throat.         Wears HA    Eyes: Positive for visual disturbance.        Poor vision    Respiratory: Positive for cough. Negative for shortness of breath and wheezing.    Cardiovascular: Negative for chest pain and leg swelling.   Gastrointestinal: Negative for abdominal distention, abdominal pain, diarrhea and nausea.   Genitourinary: Negative for dysuria.   Musculoskeletal: Negative for arthralgias and myalgias.   Skin: Negative for color change, rash and wound.   Neurological: Positive for weakness. Negative  for dizziness and numbness.   Psychiatric/Behavioral: Negative for agitation, behavioral problems, confusion and sleep disturbance. .  Vitals:    04/14/20 1225   BP: 139/79   Pulse: 62   Resp: 20   Temp: 99  F (37.2  C)   SpO2: 91%   Weight: 170 lb (77.1 kg)       Physical Exam    Pulmonary/Chest: Upper airway congestion nasal congestion   Intermittent constitutional: He is oriented to person, place, and time. He appears well-developed and well-nourished.   HENT:   Head: Normocephalic.   Eyes: Conjunctivae are normal.   Neck: Normal range of motion.   Cardiovascular: Normal rate and regular rhythm.   Murmur heard.  pacemaker   Pulmonary/Chest: No respiratory distress. He has no wheezes.   Abdominal: Soft. Bowel sounds are normal. He exhibits no distension. There is no tenderness.   Musculoskeletal: Normal range of motion. He exhibits no edema.   Neurological: He is alert and oriented to person, place, and time.   Skin: Skin is warm and dry.   Psychiatric: He has a normal mood and affect. His behavior is normal.      LABS:   No results found for this or any previous visit (from the past 240 hour(s)).     Current Outpatient Medications   Medication Sig     acetaminophen (TYLENOL) 325 MG tablet Take 650 mg by mouth 4 (four) times a day. Takes at 0200, 0800, 1400, and 2000.     bisacodyl (DULCOLAX) 10 mg suppository Insert 10 mg into the rectum daily as needed.     calcium carbonate-vitamin D3 (CALCIUM 600 WITH VITAMIN D3) 600 mg(1,500mg) -400 unit cap Take 1 tablet by mouth daily.     calcium, as carbonate, (TUMS) 200 mg calcium (500 mg) chewable tablet Chew 1 tablet 3 (three) times a day as needed for heartburn.     cloNIDine HCl (CATAPRES) 0.2 MG tablet Take 0.4 mg by mouth 2 (two) times a day.            loratadine (CLARITIN) 10 mg tablet Take 10 mg by mouth daily.     metoprolol succinate (TOPROL-XL) 25 MG Take 25 mg by mouth daily.     senna-docusate (PERICOLACE) 8.6-50 mg tablet Take 2 tablets by mouth 2 (two)  times a day.     Case Management:  I have reviewed the facility/SNF care plan/MDS which was done on 1/27/2020 and is due again on 4/23/2020   including the falls risk, nutrition and pain screening. I also reviewed the current immunizations, and preventive care.. Future cancer screening is not clinically indicated secondary to age/goals of care.   Patient's desire to return to the community is not assessible due to cognitive impairment.    Information reviewed:  Medications, vital signs, orders, and nursing notes.    ASSESSMENT:      ICD-10-CM    1. Benign Essential Hypertension  I10    2. Cough  R05        PLAN:    Hypertension nmetoprolol succinate 25 mg daily and  continue clonidine to 0.4 two times a day.      Atrial fibrillation - not on coumadin      Chronic pain continue Tylenol     Cough chronic prior interventions were unsuccessful including discontinuation of his lisinopril Claritin started today       Electronically signed by: Michelle Mitchell CNP

## 2021-06-09 NOTE — PROGRESS NOTES
[facility]    Facility:   [facility] [[id number]]   Code Status: DNR/DNI       Chief Complaint   Patient presents with     Review Of Multiple Medical Conditions     LT 6/30/2020. HTN, chronic pain, general debility.       HPI:   Kelvin is a 89 y.o. male with hx of HTN, chronic pain, compression fractures, prostate cancer, seen for routine physician follow up in LT. He was hospitalized Aug 2018 for concern of pneumonia. Hx of multiple compression fractures, weakness and FTT. Ultimately admitted to LT here at [facility].      Today:  He has been pretty stable. No acute concerns. He had a fall 5/17/20 without injury, no falls since. No skin issues. Appetite has been able. He is very [place], has been doing some video visits with family due to coronavirus related visitor restrictions. Has chronic pain for which he is on scheduled acetaminophen. Appears adequately controlled. He is treated for HTN with metoprolol and clonidine. BPs 110-171 systolic. He denies headaches, dizziness or palpitations. No shortness of breath or chest pain. No fever reported. He propels himself in his wheelchair.       Past Medical History:  Past Medical History:   Diagnosis Date     Aortic stenosis      Blind right eye      Cholelithiasis      Coronary artery disease     atherosclerotic aorta     Diverticular disease      Heart block AV third degree (H)      Heart murmur      Hyperlipidemia      Hypertension      Inguinal hernia     right     L1 vertebral fracture (H) 08/18/2014     Leukocytosis      Pacemaker      Pneumonia      Postoperative atrial fibrillation (H)     Had for few days after pacemaker placement     Prostate cancer (H)     prostate cancer, skin cancer     Syncope 01/2014     T12 vertebral fracture (H) 08/18/2014       Medications:  Current Outpatient Medications   Medication Sig     acetaminophen (TYLENOL) 325 MG tablet Take 650 mg by mouth 4 (four) times a day.  Takes at 0200, 0800, 1400, and 2000.     bisacodyl (DULCOLAX) 10 mg suppository Insert 10 mg into the rectum daily as needed.     calcium carbonate-vitamin D3 (CALCIUM 600 WITH VITAMIN D3) 600 mg(1,500mg) -400 unit cap Take 1 tablet by mouth daily.     calcium, as carbonate, (TUMS) 200 mg calcium (500 mg) chewable tablet Chew 1 tablet 3 (three) times a day as needed for heartburn.     cloNIDine HCl (CATAPRES) 0.2 MG tablet Take 0.4 mg by mouth 2 (two) times a day.            loratadine (CLARITIN) 10 mg tablet Take 10 mg by mouth daily.     metoprolol succinate (TOPROL-XL) 25 MG Take 25 mg by mouth daily.     senna-docusate (PERICOLACE) 8.6-50 mg tablet Take 2 tablets by mouth 2 (two) times a day.       Physical Exam:   Note: COVID-19 pandemic precautions in place. Physical exam performed with social distancing considerations.  General: Patient is alert, elderly male, [place], no distress.   Vitals: /74, Temp 98.9, Pulse 65, RR 18, O2 sat 95%RA.  HEENT: Head is NCAT. Eyes show no injection or icterus. Nares negative. Oropharynx well hydrated.  Neck: No JVD.  Lungs: Non labored respirations.  Abdomen: Soft.  Extremities: No LE edema.  Musculoskeletal: Age related degen changes.  Skin: No open areas.   Psych: Mood appears pretty good.      Labs:  Lab Results   Component Value Date    WBC 15.4 (H) 12/16/2019    HGB 12.1 (L) 12/16/2019    HCT 37.2 (L) 12/16/2019    MCV 98 12/16/2019     12/16/2019     Results for orders placed or performed in visit on 04/05/19   Basic Metabolic Panel   Result Value Ref Range    Sodium 138 136 - 145 mmol/L    Potassium 4.1 3.5 - 5.0 mmol/L    Chloride 103 98 - 107 mmol/L    CO2 30 22 - 31 mmol/L    Anion Gap, Calculation 5 5 - 18 mmol/L    Glucose 84 70 - 125 mg/dL    Calcium 10.1 8.5 - 10.5 mg/dL    BUN 16 8 - 28 mg/dL    Creatinine 0.67 (L) 0.70 - 1.30 mg/dL    GFR MDRD Af Amer >60 >60 mL/min/1.73m2    GFR MDRD Non Af Amer >60 >60 mL/min/1.73m2       Assessment/Plan:  1.  Debilitation. Advanced age, DJD, hx of compression fractures. Resides in LTC.  2. Chronic pain. Stable on scheduled tylenol.  3. HTN. Overall acceptable, Continue metoprolol and clonidine.   4. Afib. Not on warfarin. Adequate rate control.  5. Hx of prostate cancer. Sees urology.     Overall he appears stable and no new orders are needed at this time.         Electronically signed by: Yojana Barahona MD     English

## 2021-06-10 NOTE — PROGRESS NOTES
[facility]    Facility:   [facility] [[id number]]   Code Status: DNR      CHIEF COMPLAINT/REASON FOR VISIT:  Chief Complaint   Patient presents with     FVP Care Coordination - Regulatory       HISTORY:      HPI: Kelvin is a 89 y.o. male  residing at [facility]. He is with history of chronic back pain ( on Tylenol), multiple chronic compression fractures. Hypertension, atrial fibrillation not on coumadin       Today he is seen for  a routine regulatory visit. He denied any discomfort.  .  He denied CP.  He denied HA. His weights have been stable over the last month. His BP's are stable.    He is moving his bowels and had no issues with urination.  He has no active coughing or congestion at this time.  Vital signs are stable and he tells me he feels good.  Staff has no concerns    Past Medical History:   Diagnosis Date     Aortic stenosis      Blind right eye      Cholelithiasis      Coronary artery disease     atherosclerotic aorta     Diverticular disease      Heart block AV third degree (H)      Heart murmur      Hyperlipidemia      Hypertension      Inguinal hernia     right     L1 vertebral fracture (H) 08/18/2014     Leukocytosis      Pacemaker      Pneumonia      Postoperative atrial fibrillation (H)     Had for few days after pacemaker placement     Prostate cancer (H)     prostate cancer, skin cancer     Syncope 01/2014     T12 vertebral fracture (H) 08/18/2014             Family History   Problem Relation Age of Onset     Heart attack Father      Social History     Socioeconomic History     Marital status:      Spouse name: Not on file     Number of children: Not on file     Years of education: Not on file     Highest education level: Not on file   Occupational History     Not on file   Social Needs     Financial resource strain: Not on file     Food insecurity     Worry: Not on file     Inability: Not on file     Transportation needs      Medical: Not on file     Non-medical: Not on file   Tobacco Use     Smoking status: Former Smoker     Years: 10.00     Last attempt to quit: 1951     Years since quittin.6     Smokeless tobacco: Never Used   Substance and Sexual Activity     Alcohol use: No     Alcohol/week: 1.7 standard drinks     Types: 2 Standard drinks or equivalent per week     Comment: rarely     Drug use: No     Sexual activity: Never   Lifestyle     Physical activity     Days per week: Not on file     Minutes per session: Not on file     Stress: Not on file   Relationships     Social connections     Talks on phone: Not on file     Gets together: Not on file     Attends Taoist service: Not on file     Active member of club or organization: Not on file     Attends meetings of clubs or organizations: Not on file     Relationship status: Not on file     Intimate partner violence     Fear of current or ex partner: Not on file     Emotionally abused: Not on file     Physically abused: Not on file     Forced sexual activity: Not on file   Other Topics Concern     Not on file   Social History Narrative    The patient lives in an assisted living facility.          Review of Systems  Constitutional: Positive for fatigue. Negative for activity change, appetite change, chills and fever.   HENT: Positive for  hearing loss. Negative for sore throat.         Wears HA    Eyes: Positive for visual disturbance.        Poor vision    Respiratory:. Negative for shortness of breath and wheezing.    Cardiovascular: Negative for chest pain and leg swelling.   Gastrointestinal: Negative for abdominal distention, abdominal pain, diarrhea and nausea.   Genitourinary: Negative for dysuria.   Musculoskeletal: Negative for arthralgias and myalgias.   Skin: Negative for color change, rash and wound.   Neurological: Positive for weakness. Negative for dizziness and numbness.   Psychiatric/Behavioral: Negative for agitation, behavioral  problems, confusion and sleep disturbance. .  Vitals:    08/05/20 1014   BP: 124/68   Pulse: 74   Resp: 20   Temp: 98.4  F (36.9  C)   SpO2: 94%   Weight: 170 lb 3.2 oz (77.2 kg)       Physical Exam    Pulmonary/Chest: Upper airway congestion nasal congestion   Intermittent constitutional: He is oriented to person, place, and time. He appears well-developed and well-nourished.   HENT:   Head: Normocephalic.   Eyes: Conjunctivae are normal.   Neck: Normal range of motion.   Cardiovascular: Normal rate and regular rhythm.   Murmur heard.  pacemaker   Pulmonary/Chest: No respiratory distress. He has no wheezes.   Abdominal: Soft. Bowel sounds are normal. He exhibits no distension. There is no tenderness.   Musculoskeletal: Normal range of motion. He exhibits no edema.   Neurological: He is alert and oriented to person, place, and time.   Skin: Skin is warm and dry.   Psychiatric: He has a normal mood and affect. His behavior is normal.   LABS:   No results found for this or any previous visit (from the past 240 hour(s)).     Current Outpatient Medications   Medication Sig     acetaminophen (TYLENOL) 325 MG tablet Take 650 mg by mouth 4 (four) times a day. Takes at 0200, 0800, 1400, and 2000.     bisacodyl (DULCOLAX) 10 mg suppository Insert 10 mg into the rectum daily as needed.     calcium carbonate-vitamin D3 (CALCIUM 600 WITH VITAMIN D3) 600 mg(1,500mg) -400 unit cap Take 1 tablet by mouth daily.     calcium, as carbonate, (TUMS) 200 mg calcium (500 mg) chewable tablet Chew 1 tablet 3 (three) times a day as needed for heartburn.     cloNIDine HCl (CATAPRES) 0.2 MG tablet Take 0.4 mg by mouth 2 (two) times a day.            loratadine (CLARITIN) 10 mg tablet Take 10 mg by mouth daily.     metoprolol succinate (TOPROL-XL) 25 MG Take 25 mg by mouth daily.     senna-docusate (PERICOLACE) 8.6-50 mg tablet Take 2 tablets by mouth 2 (two) times a day.     Case Management:  I have reviewed the facility/SNF care plan/MDS  which was done 7/23/20, including the falls risk, nutrition and pain screening. I also reviewed the current immunizations, and preventive care.. Future cancer screening is not clinically indicated secondary to age/goals of care.   Patient's desire to return to the community is not assessible due to cognitive impairment.    Information reviewed:  Medications, vital signs, orders, and nursing notes.    ASSESSMENT:      ICD-10-CM    1. Generalized muscle weakness  M62.81    2. Benign Essential Hypertension  I10        PLAN:    Hypertension nmetoprolol succinate 25 mg daily and  continue clonidine to 0.4 two times a day.      Atrial fibrillation - not on coumadin      Chronic pain continue Tylenol    Electronically signed by: Michelle Mitchell CNP

## 2021-06-12 NOTE — PROGRESS NOTES
[facility]    Facility:   [facility] [[id number]]   Code Status: DNR/DNI       Chief Complaint   Patient presents with     Review Of Multiple Medical Conditions     LT 10/29/2020.       HPI:   Kelvin is a 89 y.o. male with hx of HTN, chronic pain, compression fractures, prostate cancer, seen for routine physician follow up in LT. He was hospitalized Aug 2018 for concern of pneumonia. Hx of multiple compression fractures, weakness and FTT. Ultimately admitted to LTC at [facility].      Today:  No acute concerns today. He has been fairly stable. He is on clonidine and metoprolol for HTN. He takes scheduled tylenol for pain management related to DJD and hx of compression fractures. No opioids. He is also on bowel meds. No recent medication changes. He denies fever or cough. No open areas of skin. No concerns per nursing. He had care conference on 10/22/2020 with no issues. Appetite has been able. He is very [place], has been doing some video visits with family due to coronavirus related visitor restrictions. He denies headaches, dizziness or palpitations. No shortness of breath or chest pain. No fever reported. He propels himself in his wheelchair.       Past Medical History:  Past Medical History:   Diagnosis Date     Aortic stenosis      Blind right eye      Cholelithiasis      Coronary artery disease     atherosclerotic aorta     Diverticular disease      Heart block AV third degree (H)      Heart murmur      Hyperlipidemia      Hypertension      Inguinal hernia     right     L1 vertebral fracture (H) 08/18/2014     Leukocytosis      Pacemaker      Pneumonia      Postoperative atrial fibrillation (H)     Had for few days after pacemaker placement     Prostate cancer (H)     prostate cancer, skin cancer     Syncope 01/2014     T12 vertebral fracture (H) 08/18/2014       Medications:  Current Outpatient Medications   Medication Sig     acetaminophen (TYLENOL)  325 MG tablet Take 650 mg by mouth 4 (four) times a day. Takes at 0200, 0800, 1400, and 2000.     bisacodyl (DULCOLAX) 10 mg suppository Insert 10 mg into the rectum daily as needed.     calcium carbonate-vitamin D3 (CALCIUM 600 WITH VITAMIN D3) 600 mg(1,500mg) -400 unit cap Take 1 tablet by mouth daily.     calcium, as carbonate, (TUMS) 200 mg calcium (500 mg) chewable tablet Chew 1 tablet 3 (three) times a day as needed for heartburn.     cloNIDine HCl (CATAPRES) 0.2 MG tablet Take 0.4 mg by mouth 2 (two) times a day.            loratadine (CLARITIN) 10 mg tablet Take 10 mg by mouth daily.     metoprolol succinate (TOPROL-XL) 25 MG Take 25 mg by mouth daily.     senna-docusate (PERICOLACE) 8.6-50 mg tablet Take 2 tablets by mouth 2 (two) times a day.       Physical Exam:   Note: COVID-19 pandemic precautions in place. Physical exam performed with social distancing considerations.  General: Patient is alert, elderly male, [place], no distress.   Vitals: /84, Temp 98.4, Pulse 60, RR 18, O2 sat 96%RA.  HEENT: Head is NCAT. Eyes show no injection or icterus. Nares negative. Oropharynx well hydrated.  Neck: No JVD.  Lungs: Non labored respirations.  Abdomen: Soft.  Extremities: No LE edema.  Musculoskeletal: Age related degen changes.  Skin: No open areas.   Psych: Mood appears pretty good.      Labs:  Results for orders placed or performed in visit on 04/05/19   Basic Metabolic Panel   Result Value Ref Range    Sodium 138 136 - 145 mmol/L    Potassium 4.1 3.5 - 5.0 mmol/L    Chloride 103 98 - 107 mmol/L    CO2 30 22 - 31 mmol/L    Anion Gap, Calculation 5 5 - 18 mmol/L    Glucose 84 70 - 125 mg/dL    Calcium 10.1 8.5 - 10.5 mg/dL    BUN 16 8 - 28 mg/dL    Creatinine 0.67 (L) 0.70 - 1.30 mg/dL    GFR MDRD Af Amer >60 >60 mL/min/1.73m2    GFR MDRD Non Af Amer >60 >60 mL/min/1.73m2       Lab Results   Component Value Date    WBC 15.4 (H) 12/16/2019    HGB 12.1 (L) 12/16/2019    HCT 37.2 (L) 12/16/2019    MCV 98  12/16/2019     12/16/2019       Assessment/Plan:  1. Debilitation. Advanced age, DJD, hx of compression fractures. Stable.   2. Chronic pain. Stable on scheduled tylenol.  3. HTN. BPs are satisfactory. Continue metoprolol and clonidine.   4. Afib. Not on warfarin. Adequate rate control.  5. Hx of prostate cancer. Sees urology.   6. Vision and hearing impairment. No new concerns.         Electronically signed by: Yojana Barahona MD

## 2021-06-13 NOTE — PROGRESS NOTES
[facility]    Facility:   [facility] [[id number]]   Code Status: DNR      CHIEF COMPLAINT/REASON FOR VISIT:  Chief Complaint   Patient presents with     FVP Care Coordination - Regulatory       HISTORY:      HPI: Kelvin is a 89 y.o. male  residing at [facility]. He is with history of chronic back pain ( on Tylenol), multiple chronic compression fractures. Hypertension, atrial fibrillation not on coumadin       Today he is seen for  a routine regulatory visit. He denied any discomfort.  .  He denied CP.  He denied HA. His weights have been stable over the last month. His BP's are stable.  He is moving his bowels and had no issues with urination.  He has no active coughing or congestion at this time.  Vital signs are stable and he tells me he feels good.  Staff has no concerns    Past Medical History:   Diagnosis Date     Aortic stenosis      Blind right eye      Cholelithiasis      Coronary artery disease     atherosclerotic aorta     Diverticular disease      Heart block AV third degree (H)      Heart murmur      Hyperlipidemia      Hypertension      Inguinal hernia     right     L1 vertebral fracture (H) 08/18/2014     Leukocytosis      Pacemaker      Pneumonia      Postoperative atrial fibrillation (H)     Had for few days after pacemaker placement     Prostate cancer (H)     prostate cancer, skin cancer     Syncope 01/2014     T12 vertebral fracture (H) 08/18/2014             Family History   Problem Relation Age of Onset     Heart attack Father      Social History     Socioeconomic History     Marital status:      Spouse name: Not on file     Number of children: Not on file     Years of education: Not on file     Highest education level: Not on file   Occupational History     Not on file   Social Needs     Financial resource strain: Not on file     Food insecurity     Worry: Not on file     Inability: Not on file     Transportation needs      Medical: Not on file     Non-medical: Not on file   Tobacco Use     Smoking status: Former Smoker     Years: 10.00     Quit date: 1951     Years since quittin.9     Smokeless tobacco: Never Used   Substance and Sexual Activity     Alcohol use: No     Alcohol/week: 1.7 standard drinks     Types: 2 Standard drinks or equivalent per week     Comment: rarely     Drug use: No     Sexual activity: Never   Lifestyle     Physical activity     Days per week: Not on file     Minutes per session: Not on file     Stress: Not on file   Relationships     Social connections     Talks on phone: Not on file     Gets together: Not on file     Attends Taoist service: Not on file     Active member of club or organization: Not on file     Attends meetings of clubs or organizations: Not on file     Relationship status: Not on file     Intimate partner violence     Fear of current or ex partner: Not on file     Emotionally abused: Not on file     Physically abused: Not on file     Forced sexual activity: Not on file   Other Topics Concern     Not on file   Social History Narrative    The patient lives in an assisted living facility.          Review of Systems  Constitutional: Positive for fatigue. Negative for activity change, appetite change, chills and fever.   HENT: Positive for  hearing loss. Negative for sore throat.         Wears HA    Eyes: Positive for visual disturbance.        Poor vision    Respiratory:. Negative for shortness of breath and wheezing.    Cardiovascular: Negative for chest pain and leg swelling.   Gastrointestinal: Negative for abdominal distention, abdominal pain, diarrhea and nausea.   Genitourinary: Negative for dysuria.   Musculoskeletal: Negative for arthralgias and myalgias.   Skin: Negative for color change, rash and wound.   Neurological: Positive for weakness. Negative for dizziness and numbness.   Psychiatric/Behavioral: Negative for agitation, behavioral problems, confusion and sleep  disturbance. .  Vitals:    12/09/20 0842   BP: 150/84   Pulse: 61   Resp: 18   Temp: 98.7  F (37.1  C)   SpO2: 93%   Weight: 167 lb 8 oz (76 kg)       Physical Exam    Pulmonary/Chest: Upper airway congestion nasal congestion   Intermittent constitutional: He is oriented to person, place, and time. He appears well-developed and well-nourished.   HENT:   Head: Normocephalic.   Eyes: Conjunctivae are normal.   Neck: Normal range of motion.   Cardiovascular: Normal rate and regular rhythm.   Murmur heard.  pacemaker   Pulmonary/Chest: No respiratory distress. He has no wheezes.   Abdominal: Soft. Bowel sounds are normal. He exhibits no distension. There is no tenderness.   Musculoskeletal: Normal range of motion. He exhibits no edema.   Neurological: He is alert and oriented to person, place, and time.   Skin: Skin is warm and dry.   Psychiatric: He has a normal mood and affect. His behavior is normal.   LABS:   No results found for this or any previous visit (from the past 240 hour(s)).     Current Outpatient Medications   Medication Sig     acetaminophen (TYLENOL) 325 MG tablet Take 650 mg by mouth 4 (four) times a day. Takes at 0200, 0800, 1400, and 2000.     bisacodyl (DULCOLAX) 10 mg suppository Insert 10 mg into the rectum daily as needed.     calcium carbonate-vitamin D3 (CALCIUM 600 WITH VITAMIN D3) 600 mg(1,500mg) -400 unit cap Take 1 tablet by mouth daily.     calcium, as carbonate, (TUMS) 200 mg calcium (500 mg) chewable tablet Chew 1 tablet 3 (three) times a day as needed for heartburn.     cloNIDine HCl (CATAPRES) 0.2 MG tablet Take 0.4 mg by mouth 2 (two) times a day.            loratadine (CLARITIN) 10 mg tablet Take 10 mg by mouth daily.     metoprolol succinate (TOPROL-XL) 25 MG Take 25 mg by mouth daily.     senna-docusate (PERICOLACE) 8.6-50 mg tablet Take 2 tablets by mouth 2 (two) times a day.     Case Management:  I have reviewed the facility/SNF care plan/MDS which was done 10/21/20 including the  falls risk, nutrition and pain screening. I also reviewed the current immunizations, and preventive care.. Future cancer screening is not clinically indicated secondary to age/goals of care.   Patient's desire to return to the community is not assessible due to cognitive impairment.    Information reviewed:  Medications, vital signs, orders, and nursing notes.    ASSESSMENT:      ICD-10-CM    1. Benign Essential Hypertension  I10    2. Physical deconditioning  R53.81        PLAN:    Hypertension nmetoprolol succinate 25 mg daily and  continue clonidine to 0.4 two times a day.      Atrial fibrillation - not on coumadin      Chronic pain continue Tylenol    Electronically signed by: Michelle Mitchell CNP

## 2021-06-16 PROBLEM — R62.7 ADULT FAILURE TO THRIVE: Chronic | Status: ACTIVE | Noted: 2018-08-21

## 2021-06-16 PROBLEM — R09.02 HYPOXIA: Status: ACTIVE | Noted: 2018-08-21

## 2021-06-16 PROBLEM — M62.81 GENERALIZED MUSCLE WEAKNESS: Status: ACTIVE | Noted: 2018-08-21

## 2021-06-16 PROBLEM — Z95.0 CARDIAC PACEMAKER IN SITU: Status: ACTIVE | Noted: 2018-03-07

## 2021-06-16 NOTE — PROGRESS NOTES
In clinic device check with Device Nurse followed by office visit with Dr. Donovan.  Please see link for full device report.  Patient was informed of results and next follow up during today's visit.

## 2021-06-17 NOTE — TELEPHONE ENCOUNTER
Telephone Encounter by Kathy Ibarra RN at 2/22/2021  2:47 PM     Author: Kathy Ibarra RN Service: -- Author Type: Registered Nurse    Filed: 2/22/2021  2:50 PM Encounter Date: 2/22/2021 Status: Signed    : Kathy Ibarra RN (Registered Nurse)       [facility] Nurse Triage Telephone Note      Provider: WILMER Mcnally  Facility: [facility] Type: [facility]    Caller: Niesha  Call Back Number:  [phone]    Allergies: Patient has no known allergies.    Reason for call: Chest Xray done over the wknd d/t whz and congestion, pt continues to be congested.   Vitals: BP:  167/75  P:: 6  SPO2: 90% R/A Temp.:  98   Pt up eating and drinking as usual. Has a PRN cobivent inhaler.          Verbal Order/Direction given by Provider: IS q2h while awake x 5days CBC 2/26    Provider giving order: WILMER Mcnally    Verbal order given to: Niesha Ibarra RN

## 2021-06-18 NOTE — LETTER
Letter by Yojana Barahona MD at      Author: Yojana Barahona MD Service: -- Author Type: --    Filed:  Encounter Date: 2/26/2019 Status: (Other)         Patient: Kelvin Cheney   MR Number: [id number]   YOB: 1921   Date of Visit: 2/26/2019     [facility]    Facility:   [facility] [[id number]]   Code Status: DNR/DNI       Chief Complaint   Patient presents with   ? Review Of Multiple Medical Conditions     LT 2/26/19.       HPI:   Kelvin is a 89 y.o. male with hx of HTN, chronic pain, compression fractures, prostate cancer, seen for routine physician follow up in LTC. He was hospitalized Aug 2018 for concern of pneumonia. Hx of multiple compression fractures, weakness and FTT. Ultimately admitted to LTC here.    Today:  He is essentially non ambulatory but able to propel self in wheelchair. He is [place], likes to read in his room. Appetite is stable. Complains of chronic cough, not really noted by staff. No fever or hypoxia. He has chronic pain, on scheduled opioids, sufficiently managed per his report. He denies shortness of breath or chest pain. No diarrhea or constipation.      Past Medical History:  Past Medical History:   Diagnosis Date   ? Aortic stenosis    ? Blind right eye    ? Cholelithiasis    ? Coronary artery disease     atherosclerotic aorta   ? Diverticular disease    ? Heart block AV third degree (H)    ? Heart murmur    ? Hyperlipidemia    ? Hypertension    ? Inguinal hernia     right   ? L1 vertebral fracture (H) 08/18/2014   ? Leukocytosis    ? Pacemaker    ? Pneumonia    ? Postoperative atrial fibrillation (H)     Had for few days after pacemaker placement   ? Prostate cancer (H)     prostate cancer, skin cancer   ? Syncope 01/2014   ? T12 vertebral fracture (H) 08/18/2014       Medications:  Current Outpatient Medications   Medication Sig   ? acetaminophen (TYLENOL) 325 MG tablet Take 650 mg by mouth 4 (four) times a day. Takes  at 0200, 0800, 1400, and 2000.   ? bisacodyl (DULCOLAX) 10 mg suppository Insert 10 mg into the rectum daily as needed.   ? calcium, as carbonate, (TUMS) 200 mg calcium (500 mg) chewable tablet Chew 1 tablet 3 (three) times a day as needed for heartburn.   ? cloNIDine HCl (CATAPRES) 0.2 MG tablet Take 0.2 mg by mouth 2 (two) times a day.   ? leuprolide (LUPRON) 30 mg injection Inject 30 mg into the shoulder, thigh, or buttocks every 4 (four) months.   ? lisinopril (PRINIVIL,ZESTRIL) 5 MG tablet Take 40 mg by mouth daily.          ? oxyCODONE (ROXICODONE) 5 MG immediate release tablet Take 0.5 tablets (2.5 mg total) by mouth 3 (three) times a day. Takes at 0800, 1400, and 2000   ? oxyCODONE (ROXICODONE) 5 MG immediate release tablet Take 0.5 tablets (2.5 mg total) by mouth 2 (two) times a day as needed for pain.   ? senna-docusate (PERICOLACE) 8.6-50 mg tablet Take 2 tablets by mouth 2 (two) times a day.       Physical Exam:   General: Patient is alert, elderly male, [place], no distress.   Vitals: Reviewed.  HEENT: Head is NCAT. Eyes show no injection or icterus. Nares negative. Oropharynx well hydrated.  Neck: Supple. No tenderness or adenopathy. No JVD.  Lungs: Diminished at bases. No wheezes.  Cardiovascular: Regular rate and rhythm, systolic murmur.  Back: No spinal or CVA tenderness.  Abdomen: Soft, no tenderness on exam. Bowel sounds present. No guarding rebound or rigidity.  : Deferred.  Extremities: No LE edema is noted.  Musculoskeletal: Age related degen changes.  Skin: Warm and dry.  Psych: Mood appears pretty good.      Labs:  Lab Results   Component Value Date    WBC 11.0 11/15/2018    HGB 12.6 (L) 11/15/2018    HCT 39.9 (L) 11/15/2018     (H) 11/15/2018     11/15/2018     Results for orders placed or performed in visit on 11/26/18   Basic Metabolic Panel   Result Value Ref Range    Sodium 137 136 - 145 mmol/L    Potassium 4.3 3.5 - 5.0 mmol/L    Chloride 100 98 - 107 mmol/L    CO2 29 22 -  31 mmol/L    Anion Gap, Calculation 8 5 - 18 mmol/L    Glucose 93 70 - 125 mg/dL    Calcium 10.4 8.5 - 10.5 mg/dL    BUN 18 8 - 28 mg/dL    Creatinine 0.75 0.70 - 1.30 mg/dL    GFR MDRD Af Amer >60 >60 mL/min/1.73m2    GFR MDRD Non Af Amer >60 >60 mL/min/1.73m2         Assessment/Plan:  1. HTN. On antihypertensives. Cont to monitor.  2. Afib. Not on warfarin. Adequate rate control.  3. Chronic pain. Stable.  4. Anemia. Labs as noted.  5. Hx prostate cancer. Recently saw urology for follow up.        Electronically signed by: Yojana Barahona MD

## 2021-06-18 NOTE — LETTER
Letter by Michelle Mitchell CNP at      Author: Michelle Mitchell CNP Service: -- Author Type: --    Filed:  Encounter Date: 1/2/2019 Status: (Other)         Patient: Kelvin Cheney   MR Number: [id number]   YOB: 1921   Date of Visit: 1/2/2019     [facility]    Facility:   [facility] [[id number]]   Code Status: DNR/DNI      CHIEF COMPLAINT/REASON FOR VISIT:  Chief Complaint   Patient presents with   ? Review Of Multiple Medical Conditions       HISTORY:      HPI: Kelvin is a 89 y.o. male residing at [facility]. He is with history of chronic back pain ( on oxycodone), multiple chronic compression fractures. Hypertension, atrial fibrillation not on coumadin He is new to this facility. He was admitted to [facility] on 8/21/18. He was recently hospitalized on 8/19 for failure to thrive,  weakness and pneumonia      Today he is seen for a routine medical visit to review multiple medical issues. . He was seen in his room. He denied CP.  He denied HA.  He does have chronic back pain but tells me today his pain is controlled.  .  His Blood pressures were also  reviewed and noted to be elevated with a few lower readings.   His lisinopril was increased to 40 mg daily. His weights is down 5 pounds over the last 2 months..  He reported intermittent abdominal pain which is relieved with Tums. BMP 11/26 WNL.    Past Medical History:   Diagnosis Date   ? Aortic stenosis    ? Blind right eye    ? Cholelithiasis    ? Coronary artery disease     atherosclerotic aorta   ? Diverticular disease    ? Heart block AV third degree (H)    ? Heart murmur    ? Hyperlipidemia    ? Hypertension    ? Inguinal hernia     right   ? L1 vertebral fracture (H) 08/18/2014   ? Leukocytosis    ? Pacemaker    ? Pneumonia    ? Postoperative atrial fibrillation (H)     Had for few days after pacemaker placement   ? Prostate cancer (H)     prostate cancer, skin cancer   ? Syncope 01/2014   ?  T12 vertebral fracture (H) 2014             Family History   Problem Relation Age of Onset   ? Heart attack Father      Social History     Socioeconomic History   ? Marital status:      Spouse name: Not on file   ? Number of children: Not on file   ? Years of education: Not on file   ? Highest education level: Not on file   Social Needs   ? Financial resource strain: Not on file   ? Food insecurity - worry: Not on file   ? Food insecurity - inability: Not on file   ? Transportation needs - medical: Not on file   ? Transportation needs - non-medical: Not on file   Occupational History   ? Not on file   Tobacco Use   ? Smoking status: Former Smoker     Years: 10.00     Last attempt to quit: 1951     Years since quittin.0   ? Smokeless tobacco: Never Used   Substance and Sexual Activity   ? Alcohol use: No     Alcohol/week: 1.0 oz     Types: 2 Standard drinks or equivalent per week     Comment: rarely   ? Drug use: No   ? Sexual activity: No   Other Topics Concern   ? Not on file   Social History Narrative    The patient lives in an assisted living facility.          Review of Systems   Constitutional: Positive for fatigue. Negative for activity change, appetite change, chills and fever.   HENT: Positive for hearing loss. Negative for congestion and sore throat.         Wears HA    Eyes: Positive for visual disturbance.        Poor vision    Respiratory: Positive for cough. Negative for shortness of breath and wheezing.    Cardiovascular: Negative for chest pain and leg swelling.   Gastrointestinal: Negative for abdominal distention, abdominal pain, diarrhea and nausea.   Genitourinary: Negative for dysuria.   Musculoskeletal: Negative for arthralgias and myalgias.   Skin: Negative for color change, rash and wound.   Neurological: Positive for weakness. Negative for dizziness and numbness.   Psychiatric/Behavioral: Negative for agitation, behavioral problems, confusion and sleep disturbance.        .  Vitals:    01/02/19 0853   BP: 147/85   Pulse: 62   Resp: 20   Temp: 98.2  F (36.8  C)   SpO2: 95%   Weight: 157 lb (71.2 kg)       Physical Exam   Constitutional: He is oriented to person, place, and time. He appears well-developed and well-nourished.   HENT:   Head: Normocephalic.   Eyes: Conjunctivae are normal.   Neck: Normal range of motion.   Cardiovascular: Normal rate and regular rhythm.   Murmur heard.  pacemaker   Pulmonary/Chest: No respiratory distress. He has no wheezes. He has rales.   LLL   Abdominal: Soft. Bowel sounds are normal. He exhibits no distension. There is no tenderness.   Musculoskeletal: Normal range of motion. He exhibits no edema.   Neurological: He is alert and oriented to person, place, and time.   Skin: Skin is warm and dry.   Psychiatric: He has a normal mood and affect. His behavior is normal.         LABS:   No results found for this or any previous visit (from the past 240 hour(s)).  Current Outpatient Medications   Medication Sig   ? acetaminophen (TYLENOL) 325 MG tablet Take 650 mg by mouth 4 (four) times a day. Takes at 0200, 0800, 1400, and 2000.   ? bisacodyl (DULCOLAX) 10 mg suppository Insert 10 mg into the rectum daily as needed.   ? calcium, as carbonate, (TUMS) 200 mg calcium (500 mg) chewable tablet Chew 1 tablet 3 (three) times a day as needed for heartburn.   ? cloNIDine HCl (CATAPRES) 0.2 MG tablet Take 0.2 mg by mouth 2 (two) times a day.   ? leuprolide (LUPRON) 30 mg injection Inject 30 mg into the shoulder, thigh, or buttocks every 4 (four) months.   ? lisinopril (PRINIVIL,ZESTRIL) 5 MG tablet Take 40 mg by mouth daily.          ? oxyCODONE (ROXICODONE) 5 MG immediate release tablet Take 0.5 tablets (2.5 mg total) by mouth 3 (three) times a day. Takes at 0800, 1400, and 2000   ? oxyCODONE (ROXICODONE) 5 MG immediate release tablet Take 0.5 tablets (2.5 mg total) by mouth 2 (two) times a day as needed for pain.   ? senna-docusate (PERICOLACE) 8.6-50 mg  tablet Take 2 tablets by mouth 2 (two) times a day.     ASSESSMENT:      ICD-10-CM    1. Benign Essential Hypertension I10    2. Chronic pain syndrome G89.4    3. Atrial fibrillation, unspecified type (H) I48.91    4. Generalized muscle weakness M62.81        PLAN:    Hypertension- increase lisinopril to 40 mg daily  Constipation- resolved  Back pain-  oxycodone - chronic denied today.   Weakness. Overall failure to thrive, advanced age.   Atrial fibrillation - not on coumadin   Cough reports mostly at night chronic     Electronically signed by: Michelle Mitchell, CNP

## 2021-06-19 NOTE — LETTER
Letter by Laina Joe at      Author: Laina Joe Service: -- Author Type: --    Filed:  Encounter Date: 3/20/2019 Status: (Other)         Kelvin Cheney  [address]  [place] [facility] [address]      March 20, 2019      Dear Mr. Cheney    RE: Remote Results    We are writing to you regarding your recent Remote Pacemaker check from home. Your transmission was received successfully. Battery status is satisfactory at this time.     Your results are within normal limits.    Your next device appointment will be a remote check on June 24, 2019; this will occur automatically.    To schedule or reschedule, please call [phone] and press 1.    NOTE: If you would like to do an extra transmission, please call [phone] and press 3 to speak to a nurse BEFORE transmitting. This ensures that the Device Clinic staff is aware of the reason you are sending a transmission, and can follow-up with you after it has been reviewed.    We will be checking your implanted device from home (remotely) every three months unless otherwise instructed. We will need to see you in the clinic at least once a year. You may need to be seen in the clinic sooner depending on the results of your check.    Please be aware:    The follow-up schedule is like a Physician prescription.    Your remote monitor is paired to your specific implanted device.      Sincerely,    [facility]

## 2021-06-19 NOTE — LETTER
Letter by Michelle Mitchell CNP at      Author: Michelle Mitchell CNP Service: -- Author Type: --    Filed:  Encounter Date: 9/11/2019 Status: (Other)         Patient: Kelvin Cheney   MR Number: [id number]   YOB: 1921   Date of Visit: 9/11/2019     [facility]    Facility:   Formerly named [facility] [[id number]]   Code Status: DNR/DNI      CHIEF COMPLAINT/REASON FOR VISIT:  Chief Complaint   Patient presents with   ? FVP Care Coordination - Regulatory       HISTORY:      HPI: Kelvin is a 89 y.o. male  residing at [facility]. He is with history of chronic back pain ( on oxycodone), multiple chronic compression fractures. Hypertension, atrial fibrillation not on coumadin  He was [place] [facility] [place]18. He was recently hospitalized on 8/19 for failure to thrive,  weakness and pneumonia       Today he is seen for   a routine visit to review multiple medical issues. . He denied CP.  He denied HA. His weights have been stable over the last month. His BP's are noted to be elevated and clonidine was increased. He does have a chronic intermittent cough with congestion. He tells me his secretions are clear.  He denied nasal congestion. He is moving his bowels and had no issues with urination.      Past Medical History:   Diagnosis Date   ? Aortic stenosis    ? Blind right eye    ? Cholelithiasis    ? Coronary artery disease     atherosclerotic aorta   ? Diverticular disease    ? Heart block AV third degree (H)    ? Heart murmur    ? Hyperlipidemia    ? Hypertension    ? Inguinal hernia     right   ? L1 vertebral fracture (H) 08/18/2014   ? Leukocytosis    ? Pacemaker    ? Pneumonia    ? Postoperative atrial fibrillation (H)     Had for few days after pacemaker placement   ? Prostate cancer (H)     prostate cancer, skin cancer   ? Syncope 01/2014   ? T12 vertebral fracture (H) 08/18/2014             Family History   Problem Relation Age of Onset   ? Heart attack  Father      Social History     Socioeconomic History   ? Marital status:      Spouse name: Not on file   ? Number of children: Not on file   ? Years of education: Not on file   ? Highest education level: Not on file   Occupational History   ? Not on file   Social Needs   ? Financial resource strain: Not on file   ? Food insecurity:     Worry: Not on file     Inability: Not on file   ? Transportation needs:     Medical: Not on file     Non-medical: Not on file   Tobacco Use   ? Smoking status: Former Smoker     Years: 10.00     Last attempt to quit: 1951     Years since quittin.7   ? Smokeless tobacco: Never Used   Substance and Sexual Activity   ? Alcohol use: No     Alcohol/week: 1.0 oz     Types: 2 Standard drinks or equivalent per week     Comment: rarely   ? Drug use: No   ? Sexual activity: Never   Lifestyle   ? Physical activity:     Days per week: Not on file     Minutes per session: Not on file   ? Stress: Not on file   Relationships   ? Social connections:     Talks on phone: Not on file     Gets together: Not on file     Attends [employer] service: Not on file     Active member of club or organization: Not on file     Attends meetings of clubs or organizations: Not on file     Relationship status: Not on file   ? Intimate partner violence:     Fear of current or ex partner: Not on file     Emotionally abused: Not on file     Physically abused: Not on file     Forced sexual activity: Not on file   Other Topics Concern   ? Not on file   Social History Narrative    The patient lives in an assisted living facility.          Review of Systems  Constitutional: Positive for fatigue. Negative for activity change, appetite change, chills and fever.   HENT: Positive for congestion and hearing loss. Negative for sore throat.         Wears HA    Eyes: Positive for visual disturbance.        Poor vision    Respiratory: Positive for cough. Negative for shortness of breath and wheezing.    Cardiovascular:  Negative for chest pain and leg swelling.   Gastrointestinal: Negative for abdominal distention, abdominal pain, diarrhea and nausea.   Genitourinary: Negative for dysuria.   Musculoskeletal: Negative for arthralgias and myalgias.   Skin: Negative for color change, rash and wound.   Neurological: Positive for weakness. Negative for dizziness and numbness.   Psychiatric/Behavioral: Negative for agitation, behavioral problems, confusion and sleep disturbance.  .  Vitals:    09/11/19 1035   BP: (!) 159/95   Pulse: 62   Resp: 20   Temp: 98  F (36.7  C)   SpO2: 99%   Weight: 164 lb (74.4 kg)       Physical Exam   Pulmonary/Chest: He has rales.   Bilateral bases.        Constitutional: He is oriented to person, place, and time. He appears well-developed and well-nourished.   HENT:   Head: Normocephalic.   Eyes: Conjunctivae are normal.   Neck: Normal range of motion.   Cardiovascular: Normal rate and regular rhythm.   Murmur heard.  pacemaker   Pulmonary/Chest: No respiratory distress. He has no wheezes.   Abdominal: Soft. Bowel sounds are normal. He exhibits no distension. There is no tenderness.   Musculoskeletal: Normal range of motion. He exhibits no edema.   Neurological: He is alert and oriented to person, place, and time.   Skin: Skin is warm and dry.   Psychiatric: He has a normal mood and affect. His behavior is normal.      LABS:   No results found for this or any previous visit (from the past 240 hour(s)).  Case Management:  I have reviewed the facility/SNF care plan/MDS which was done 8/7/19, including the falls risk, nutrition and pain screening. I also reviewed the current immunizations, and preventive care.. Future cancer screening is not clinically indicated secondary to age/goals of care.   Patient's desire to return to the community is not present.  Current Outpatient Medications   Medication Sig   ? acetaminophen (TYLENOL) 325 MG tablet Take 650 mg by mouth 4 (four) times a day. Takes at 0200, 0800,  1400, and 2000.   ? bisacodyl (DULCOLAX) 10 mg suppository Insert 10 mg into the rectum daily as needed.   ? calcium, as carbonate, (TUMS) 200 mg calcium (500 mg) chewable tablet Chew 1 tablet 3 (three) times a day as needed for heartburn.   ? cloNIDine HCl (CATAPRES) 0.2 MG tablet Take 0.4 mg by mouth 2 (two) times a day.          ? lisinopril (PRINIVIL,ZESTRIL) 5 MG tablet Take 40 mg by mouth daily.          ? mineral oil, bulk, Oil Use 5 drops As Directed. Both ears at HS every Tuesday for cerumen buildup   ? senna-docusate (PERICOLACE) 8.6-50 mg tablet Take 2 tablets by mouth 2 (two) times a day.     Information reviewed:  Medications, vital signs, orders, and nursing notes.    ASSESSMENT:      ICD-10-CM    1. Debility R53.81    2. Essential hypertension I10    3. Atrial fibrillation, unspecified type (H) I48.91    4. Other chronic pain G89.29        PLAN:    Hypertension-lisinopril 40 mg daily, increase clonidine to 0.4 two times a day.     Atrial fibrillation - not on coumadin     Chronic pain continue Tylenol    Cough chronic reports clear secretions       Electronically signed by: Michelle Mitchell CNP

## 2021-06-19 NOTE — LETTER
Letter by Michelle Mitchell CNP at      Author: Michelle Mitchell CNP Service: -- Author Type: --    Filed:  Encounter Date: 7/25/2019 Status: (Other)         Patient: Kelvin Cheney   MR Number: [id number]   YOB: 1921   Date of Visit: 7/25/2019     [facility]    Facility:   [facility] [[id number]]   Code Status: DNR/DNI      CHIEF COMPLAINT/REASON FOR VISIT:  Chief Complaint   Patient presents with   ? Problem Visit     congestion       HISTORY:      HPI: Kelvin is a 89 y.o. male residing at [facility]. He is with history of chronic back pain ( on oxycodone), multiple chronic compression fractures. Hypertension, atrial fibrillation not on coumadin He is new to this facility. He was admitted to [facility] on 8/21/18. He was recently hospitalized on 8/19 for failure to thrive,  weakness and pneumonia      Today he is seen for reports of increased nasal congestion.  He reported his congestion was more upper airway. He had no sinus tenderness, afebrile. He reports the congestion is worse at night. He tells me it is clear. He is able to get it up most of the time but reports it is sometimes thick.   His lungs were clear and he had no lower extremity edema.  He was seen in his room. He denied CP.  He denied HA. His weights have been stable over the last month    Past Medical History:   Diagnosis Date   ? Aortic stenosis    ? Blind right eye    ? Cholelithiasis    ? Coronary artery disease     atherosclerotic aorta   ? Diverticular disease    ? Heart block AV third degree (H)    ? Heart murmur    ? Hyperlipidemia    ? Hypertension    ? Inguinal hernia     right   ? L1 vertebral fracture (H) 08/18/2014   ? Leukocytosis    ? Pacemaker    ? Pneumonia    ? Postoperative atrial fibrillation (H)     Had for few days after pacemaker placement   ? Prostate cancer (H)     prostate cancer, skin cancer   ? Syncope 01/2014   ? T12 vertebral fracture (H) 08/18/2014              Family History   Problem Relation Age of Onset   ? Heart attack Father      Social History     Socioeconomic History   ? Marital status:      Spouse name: Not on file   ? Number of children: Not on file   ? Years of education: Not on file   ? Highest education level: Not on file   Occupational History   ? Not on file   Social Needs   ? Financial resource strain: Not on file   ? Food insecurity:     Worry: Not on file     Inability: Not on file   ? Transportation needs:     Medical: Not on file     Non-medical: Not on file   Tobacco Use   ? Smoking status: Former Smoker     Years: 10.00     Last attempt to quit: 1951     Years since quittin.6   ? Smokeless tobacco: Never Used   Substance and Sexual Activity   ? Alcohol use: No     Alcohol/week: 1.0 oz     Types: 2 Standard drinks or equivalent per week     Comment: rarely   ? Drug use: No   ? Sexual activity: Never   Lifestyle   ? Physical activity:     Days per week: Not on file     Minutes per session: Not on file   ? Stress: Not on file   Relationships   ? Social connections:     Talks on phone: Not on file     Gets together: Not on file     Attends [employer] service: Not on file     Active member of club or organization: Not on file     Attends meetings of clubs or organizations: Not on file     Relationship status: Not on file   ? Intimate partner violence:     Fear of current or ex partner: Not on file     Emotionally abused: Not on file     Physically abused: Not on file     Forced sexual activity: Not on file   Other Topics Concern   ? Not on file   Social History Narrative    The patient lives in an assisted living facility.          Review of Systems   Constitutional: Positive for fatigue. Negative for activity change, appetite change, chills and fever.   HENT: Positive for congestion and hearing loss. Negative for sore throat.         Wears HA    Eyes: Positive for visual disturbance.        Poor vision    Respiratory: Positive for  cough. Negative for shortness of breath and wheezing.    Cardiovascular: Negative for chest pain and leg swelling.   Gastrointestinal: Negative for abdominal distention, abdominal pain, diarrhea and nausea.   Genitourinary: Negative for dysuria.   Musculoskeletal: Negative for arthralgias and myalgias.   Skin: Negative for color change, rash and wound.   Neurological: Positive for weakness. Negative for dizziness and numbness.   Psychiatric/Behavioral: Negative for agitation, behavioral problems, confusion and sleep disturbance.       .  Vitals:    07/25/19 1123   BP: 152/80   Pulse: 66   Resp: 20   Temp: 98.2  F (36.8  C)   SpO2: 97%   Weight: 162 lb 6.4 oz (73.7 kg)       Physical Exam   Constitutional: He is oriented to person, place, and time. He appears well-developed and well-nourished.   HENT:   Head: Normocephalic.   Eyes: Conjunctivae are normal.   Neck: Normal range of motion.   Cardiovascular: Normal rate and regular rhythm.   Murmur heard.  pacemaker   Pulmonary/Chest: No respiratory distress. He has no wheezes.   Abdominal: Soft. Bowel sounds are normal. He exhibits no distension. There is no tenderness.   Musculoskeletal: Normal range of motion. He exhibits no edema.   Neurological: He is alert and oriented to person, place, and time.   Skin: Skin is warm and dry.   Psychiatric: He has a normal mood and affect. His behavior is normal.         LABS:   No results found for this or any previous visit (from the past 240 hour(s)).  Current Outpatient Medications   Medication Sig   ? acetaminophen (TYLENOL) 325 MG tablet Take 650 mg by mouth 4 (four) times a day. Takes at 0200, 0800, 1400, and 2000.   ? bisacodyl (DULCOLAX) 10 mg suppository Insert 10 mg into the rectum daily as needed.   ? calcium, as carbonate, (TUMS) 200 mg calcium (500 mg) chewable tablet Chew 1 tablet 3 (three) times a day as needed for heartburn.   ? cloNIDine HCl (CATAPRES) 0.2 MG tablet Take 0.2 mg by mouth 2 (two) times a day.   ?  leuprolide (LUPRON) 30 mg injection Inject 30 mg into the shoulder, thigh, or buttocks every 4 (four) months.   ? lisinopril (PRINIVIL,ZESTRIL) 5 MG tablet Take 40 mg by mouth daily.          ? oxyCODONE (ROXICODONE) 5 MG immediate release tablet Take 0.5 tablets (2.5 mg total) by mouth 3 (three) times a day. Takes at 0800, 1400, and 2000   ? oxyCODONE (ROXICODONE) 5 MG immediate release tablet Take 0.5 tablets (2.5 mg total) by mouth 2 (two) times a day as needed for pain.   ? senna-docusate (PERICOLACE) 8.6-50 mg tablet Take 2 tablets by mouth 2 (two) times a day.     ASSESSMENT:      ICD-10-CM    1. Congestion of throat R68.89        PLAN:    Hypertension-lisinopril to 40 mg daily, increase clonidine0.3 two times a day.   Atrial fibrillation - not on coumadin   Cough-clear sputum, afebrile, mucinex two times a day x 5 days and staff to update provider if no relief or cough worsens.   Electronically signed by: Michelle Mitchell CNP

## 2021-06-19 NOTE — LETTER
Letter by Yojana Barahona MD at      Author: oYjana Barahona MD Service: -- Author Type: --    Filed:  Encounter Date: 10/31/2019 Status: Signed         Patient: Kelvin Cheney   MR Number: [id number]   YOB: 1921   Date of Visit: 10/31/2019     [facility]    Facility:   [facility] [[id number]]   Code Status: DNR/DNI       Chief Complaint   Patient presents with   ? Review Of Multiple Medical Conditions     LT 10/31/19.       HPI:   Kelvin is a 89 y.o. male with hx of HTN, chronic pain, compression fractures, prostate cancer, seen for routine physician follow up in LT. He was hospitalized Aug 2018 for concern of pneumonia. Hx of multiple compression fractures, weakness and FTT. Ultimately admitted to LTC here at [facility].      Today:  He has been pretty stable. Has chronic pain for which he is on scheduled oxycodone and acetaminophen. Appears adequately controlled, no need to make changes. He is treated for HTN with lisinopril and clonidine. He denies headaches, dizziness or palpitations. He denies cough today. No shortness of breath or chest pain. No fever reported. He has not had any medication changes. No concerns per nursing. He is hard of hearing, likes to spend time in his room reading but also goes to some activities. He propels himself in his wheelchair.       Past Medical History:  Past Medical History:   Diagnosis Date   ? Aortic stenosis    ? Blind right eye    ? Cholelithiasis    ? Coronary artery disease     atherosclerotic aorta   ? Diverticular disease    ? Heart block AV third degree (H)    ? Heart murmur    ? Hyperlipidemia    ? Hypertension    ? Inguinal hernia     right   ? L1 vertebral fracture (H) 08/18/2014   ? Leukocytosis    ? Pacemaker    ? Pneumonia    ? Postoperative atrial fibrillation (H)     Had for few days after pacemaker placement   ? Prostate cancer (H)     prostate cancer, skin cancer   ?  Syncope 01/2014   ? T12 vertebral fracture (H) 08/18/2014       Medications:  Current Outpatient Medications   Medication Sig   ? acetaminophen (TYLENOL) 325 MG tablet Take 650 mg by mouth 4 (four) times a day. Takes at 0200, 0800, 1400, and 2000.   ? bisacodyl (DULCOLAX) 10 mg suppository Insert 10 mg into the rectum daily as needed.   ? calcium, as carbonate, (TUMS) 200 mg calcium (500 mg) chewable tablet Chew 1 tablet 3 (three) times a day as needed for heartburn.   ? cloNIDine HCl (CATAPRES) 0.2 MG tablet Take 0.4 mg by mouth 2 (two) times a day.          ? lisinopril (PRINIVIL,ZESTRIL) 5 MG tablet Take 40 mg by mouth daily.          ? mineral oil, bulk, Oil Use 5 drops As Directed. Both ears at HS every Tuesday for cerumen buildup   ? senna-docusate (PERICOLACE) 8.6-50 mg tablet Take 2 tablets by mouth 2 (two) times a day.       Physical Exam:   General: Patient is alert, elderly male, Native, no distress.   Vitals: /78, Temp 98.5, Pulse 62, RR 20, O2 sat 95% RA  HEENT: Head is NCAT. Eyes show no injection or icterus. Nares negative. Oropharynx well hydrated.  Neck: Supple. No tenderness or adenopathy. No JVD.  Lungs: Diminished at bases. No wheezes.  Cardiovascular: Regular rate and rhythm, systolic murmur.  Back: No spinal or CVA tenderness.  Abdomen: Soft, no tenderness on exam. Bowel sounds present. No guarding rebound or rigidity.  Extremities: No LE edema.  Musculoskeletal: Age related degen changes.  Skin: Warm and dry.  Psych: Mood appears pretty good.      Labs:  Lab Results   Component Value Date    WBC 11.0 11/15/2018    HGB 12.6 (L) 11/15/2018    HCT 39.9 (L) 11/15/2018     (H) 11/15/2018     11/15/2018     Results for orders placed or performed in visit on 04/05/19   Basic Metabolic Panel   Result Value Ref Range    Sodium 138 136 - 145 mmol/L    Potassium 4.1 3.5 - 5.0 mmol/L    Chloride 103 98 - 107 mmol/L    CO2 30 22 - 31 mmol/L    Anion Gap, Calculation 5 5 - 18 mmol/L     Glucose 84 70 - 125 mg/dL    Calcium 10.1 8.5 - 10.5 mg/dL    BUN 16 8 - 28 mg/dL    Creatinine 0.67 (L) 0.70 - 1.30 mg/dL    GFR MDRD Af Amer >60 >60 mL/min/1.73m2    GFR MDRD Non Af Amer >60 >60 mL/min/1.73m2         Assessment/Plan:  1. Debilitation. Advanced age, DJD, hx of compression fractures.  2. Chronic pian. Stable on scheduled meds, continue as written.  3. HTN. Overall acceptable, Continue lisinopril and clonidine.   4. Afib. Not on warfarin. Adequate rate control.  5. Hx of prostate cancer. Saw urology in Aug, no new orders. RTC 6 months recommended.        Electronically signed by: Yojana Barahona MD

## 2021-06-19 NOTE — LETTER
Letter by Michelle Mitchell CNP at      Author: Michelle Mitchell CNP Service: -- Author Type: --    Filed:  Encounter Date: 4/3/2019 Status: (Other)         Patient: Kelvin Cheney   MR Number: [id number]   YOB: 1921   Date of Visit: 4/3/2019     [facility]    Facility:   [facility] [[id number]]   Code Status: DNR/DNI      CHIEF COMPLAINT/REASON FOR VISIT:  Chief Complaint   Patient presents with   ? Review Of Multiple Medical Conditions       HISTORY:      HPI: Kelvin is a 89 y.o. male residing at [facility]. He is with history of chronic back pain ( on oxycodone), multiple chronic compression fractures. Hypertension, atrial fibrillation not on coumadin He is new to this facility. He was admitted to [facility] on 8/21/18. He was recently hospitalized on 8/19 for failure to thrive,  weakness and pneumonia      Today he is seen for a routine medical visit to review multiple medical issues. . He was seen in his room. He denied CP.  He denied HA.  He does have chronic back pain but he denied it today.  His Blood pressures were reviewed and his SBP have been running  160-170's. He is on lisinopril and his clonidine was increased today.  His weights is down 3 pounds over the last  month.      Past Medical History:   Diagnosis Date   ? Aortic stenosis    ? Blind right eye    ? Cholelithiasis    ? Coronary artery disease     atherosclerotic aorta   ? Diverticular disease    ? Heart block AV third degree (H)    ? Heart murmur    ? Hyperlipidemia    ? Hypertension    ? Inguinal hernia     right   ? L1 vertebral fracture (H) 08/18/2014   ? Leukocytosis    ? Pacemaker    ? Pneumonia    ? Postoperative atrial fibrillation (H)     Had for few days after pacemaker placement   ? Prostate cancer (H)     prostate cancer, skin cancer   ? Syncope 01/2014   ? T12 vertebral fracture (H) 08/18/2014             Family History   Problem Relation Age of Onset   ? Heart  attack Father      Social History     Socioeconomic History   ? Marital status:      Spouse name: Not on file   ? Number of children: Not on file   ? Years of education: Not on file   ? Highest education level: Not on file   Occupational History   ? Not on file   Social Needs   ? Financial resource strain: Not on file   ? Food insecurity:     Worry: Not on file     Inability: Not on file   ? Transportation needs:     Medical: Not on file     Non-medical: Not on file   Tobacco Use   ? Smoking status: Former Smoker     Years: 10.00     Last attempt to quit: 1951     Years since quittin.2   ? Smokeless tobacco: Never Used   Substance and Sexual Activity   ? Alcohol use: No     Alcohol/week: 1.0 oz     Types: 2 Standard drinks or equivalent per week     Comment: rarely   ? Drug use: No   ? Sexual activity: No   Lifestyle   ? Physical activity:     Days per week: Not on file     Minutes per session: Not on file   ? Stress: Not on file   Relationships   ? Social connections:     Talks on phone: Not on file     Gets together: Not on file     Attends Muslim service: Not on file     Active member of club or organization: Not on file     Attends meetings of clubs or organizations: Not on file     Relationship status: Not on file   ? Intimate partner violence:     Fear of current or ex partner: Not on file     Emotionally abused: Not on file     Physically abused: Not on file     Forced sexual activity: Not on file   Other Topics Concern   ? Not on file   Social History Narrative    The patient lives in an assisted living facility.          Review of Systems   Constitutional: Positive for fatigue. Negative for activity change, appetite change, chills and fever.   HENT: Positive for hearing loss. Negative for congestion and sore throat.         Wears HA    Eyes: Positive for visual disturbance.        Poor vision    Respiratory: Positive for cough. Negative for shortness of breath and wheezing.     Cardiovascular: Negative for chest pain and leg swelling.   Gastrointestinal: Negative for abdominal distention, abdominal pain, diarrhea and nausea.   Genitourinary: Negative for dysuria.   Musculoskeletal: Negative for arthralgias and myalgias.   Skin: Negative for color change, rash and wound.   Neurological: Positive for weakness. Negative for dizziness and numbness.   Psychiatric/Behavioral: Negative for agitation, behavioral problems, confusion and sleep disturbance.       .  Vitals:    04/03/19 1442   BP: 170/90   Pulse: 64   Resp: 20   Temp: 98  F (36.7  C)   SpO2: 96%   Weight: 159 lb 6.4 oz (72.3 kg)       Physical Exam   Constitutional: He is oriented to person, place, and time. He appears well-developed and well-nourished.   HENT:   Head: Normocephalic.   Eyes: Conjunctivae are normal.   Neck: Normal range of motion.   Cardiovascular: Normal rate and regular rhythm.   Murmur heard.  pacemaker   Pulmonary/Chest: No respiratory distress. He has no wheezes.   Abdominal: Soft. Bowel sounds are normal. He exhibits no distension. There is no tenderness.   Musculoskeletal: Normal range of motion. He exhibits no edema.   Neurological: He is alert and oriented to person, place, and time.   Skin: Skin is warm and dry.   Psychiatric: He has a normal mood and affect. His behavior is normal.         LABS:   No results found for this or any previous visit (from the past 240 hour(s)).  Current Outpatient Medications   Medication Sig   ? acetaminophen (TYLENOL) 325 MG tablet Take 650 mg by mouth 4 (four) times a day. Takes at 0200, 0800, 1400, and 2000.   ? bisacodyl (DULCOLAX) 10 mg suppository Insert 10 mg into the rectum daily as needed.   ? calcium, as carbonate, (TUMS) 200 mg calcium (500 mg) chewable tablet Chew 1 tablet 3 (three) times a day as needed for heartburn.   ? cloNIDine HCl (CATAPRES) 0.2 MG tablet Take 0.2 mg by mouth 2 (two) times a day.   ? leuprolide (LUPRON) 30 mg injection Inject 30 mg into the  shoulder, thigh, or buttocks every 4 (four) months.   ? lisinopril (PRINIVIL,ZESTRIL) 5 MG tablet Take 40 mg by mouth daily.          ? oxyCODONE (ROXICODONE) 5 MG immediate release tablet Take 0.5 tablets (2.5 mg total) by mouth 3 (three) times a day. Takes at 0800, 1400, and 2000   ? oxyCODONE (ROXICODONE) 5 MG immediate release tablet Take 0.5 tablets (2.5 mg total) by mouth 2 (two) times a day as needed for pain.   ? senna-docusate (PERICOLACE) 8.6-50 mg tablet Take 2 tablets by mouth 2 (two) times a day.     ASSESSMENT:      ICD-10-CM    1. Essential hypertension I10    2. Paroxysmal atrial fibrillation (H) I48.0    3. Chronic pain syndrome G89.4    4. Generalized muscle weakness M62.81        PLAN:    Hypertension-lisinopril to 40 mg daily, increase clonidine to 0.3 two times a day  Constipation- resolved  Back pain-  oxycodone - chronic denied today.   Weakness. Overall failure to thrive, advanced age.   Atrial fibrillation - not on coumadin   Cough- intermittent  Non productive.     Electronically signed by: Michelle Mitchell CNP

## 2021-06-19 NOTE — LETTER
Letter by Michelle Mitchell CNP at      Author: Michelle Mitchell CNP Service: -- Author Type: --    Filed:  Encounter Date: 12/6/2019 Status: Signed         Patient: Kelvin Cheney   MR Number: [id number]   YOB: 1921   Date of Visit: 12/6/2019     [facility]    Facility:   [facility] [[id number]]   Code Status: DNR      CHIEF COMPLAINT/REASON FOR VISIT:  Chief Complaint   Patient presents with   ? FVP Care Coordination - Regulatory       HISTORY:      HPI: Kelvin is a 89 y.o. male  residing at [facility]. He is with history of chronic back pain ( on Tylenol), multiple chronic compression fractures. Hypertension, atrial fibrillation not on coumadin  He was admitted to [facility] on 8/21/18. He was recently hospitalized on 8/19 for failure to thrive,  weakness and pneumonia       Today he is seen for  a routine visit to review multiple medical issues. . He denied CP.  He denied HA. His weights have been stable over the last month. His BP's are noted to be elevated and he is on clonidine 0.4 mg 2 times a day he was also on lisinopril 40 mg daily which has been DC'd due to a chronic cough that he has been having unresolved from other interventions.  I did start him on metoprolol succinate 25 mg daily and close monitoring of blood his blood pressures x1 week with parameters as to when to call provider..  He is moving his bowels and had no issues with urination.     I was able to see his secretions today and they were clear.  Was noted to have crackles left lower lobe.    Past Medical History:   Diagnosis Date   ? Aortic stenosis    ? Blind right eye    ? Cholelithiasis    ? Coronary artery disease     atherosclerotic aorta   ? Diverticular disease    ? Heart block AV third degree (H)    ? Heart murmur    ? Hyperlipidemia    ? Hypertension    ? Inguinal hernia     right   ? L1 vertebral fracture (H) 08/18/2014   ? Leukocytosis    ? Pacemaker    ? Pneumonia     ? Postoperative atrial fibrillation (H)     Had for few days after pacemaker placement   ? Prostate cancer (H)     prostate cancer, skin cancer   ? Syncope 2014   ? T12 vertebral fracture (H) 2014             Family History   Problem Relation Age of Onset   ? Heart attack Father      Social History     Socioeconomic History   ? Marital status:      Spouse name: Not on file   ? Number of children: Not on file   ? Years of education: Not on file   ? Highest education level: Not on file   Occupational History   ? Not on file   Social Needs   ? Financial resource strain: Not on file   ? Food insecurity:     Worry: Not on file     Inability: Not on file   ? Transportation needs:     Medical: Not on file     Non-medical: Not on file   Tobacco Use   ? Smoking status: Former Smoker     Years: 10.00     Last attempt to quit: 1951     Years since quittin.9   ? Smokeless tobacco: Never Used   Substance and Sexual Activity   ? Alcohol use: No     Alcohol/week: 1.7 standard drinks     Types: 2 Standard drinks or equivalent per week     Comment: rarely   ? Drug use: No   ? Sexual activity: Never   Lifestyle   ? Physical activity:     Days per week: Not on file     Minutes per session: Not on file   ? Stress: Not on file   Relationships   ? Social connections:     Talks on phone: Not on file     Gets together: Not on file     Attends Worship service: Not on file     Active member of club or organization: Not on file     Attends meetings of clubs or organizations: Not on file     Relationship status: Not on file   ? Intimate partner violence:     Fear of current or ex partner: Not on file     Emotionally abused: Not on file     Physically abused: Not on file     Forced sexual activity: Not on file   Other Topics Concern   ? Not on file   Social History Narrative    The patient lives in an assisted living facility.          Review of Systems  Constitutional: Positive for fatigue. Negative for activity  change, appetite change, chills and fever.   HENT: Positive for congestion and hearing loss. Negative for sore throat.         Wears HA    Eyes: Positive for visual disturbance.        Poor vision    Respiratory: Positive for cough. Negative for shortness of breath and wheezing.    Cardiovascular: Negative for chest pain and leg swelling.   Gastrointestinal: Negative for abdominal distention, abdominal pain, diarrhea and nausea.   Genitourinary: Negative for dysuria.   Musculoskeletal: Negative for arthralgias and myalgias.   Skin: Negative for color change, rash and wound.   Neurological: Positive for weakness. Negative for dizziness and numbness.   Psychiatric/Behavioral: Negative for agitation, behavioral problems, confusion and sleep disturbance. .  Vitals:    12/06/19 1031   BP: (!) 190/96   Pulse: 60   Resp: 20   Temp: 98.2  F (36.8  C)   SpO2: 92%   Weight: 170 lb (77.1 kg)       Physical Exam    Pulmonary/Chest: He has rales.   Left lower lobe  Constitutional: He is oriented to person, place, and time. He appears well-developed and well-nourished.   HENT:   Head: Normocephalic.   Eyes: Conjunctivae are normal.   Neck: Normal range of motion.   Cardiovascular: Normal rate and regular rhythm.   Murmur heard.  pacemaker   Pulmonary/Chest: No respiratory distress. He has no wheezes.   Abdominal: Soft. Bowel sounds are normal. He exhibits no distension. There is no tenderness.   Musculoskeletal: Normal range of motion. He exhibits no edema.   Neurological: He is alert and oriented to person, place, and time.   Skin: Skin is warm and dry.   Psychiatric: He has a normal mood and affect. His behavior is normal.      LABS:   No results found for this or any previous visit (from the past 240 hour(s)).     Current Outpatient Medications   Medication Sig   ? acetaminophen (TYLENOL) 325 MG tablet Take 650 mg by mouth 4 (four) times a day. Takes at 0200, 0800, 1400, and 2000.   ? bisacodyl (DULCOLAX) 10 mg suppository  Insert 10 mg into the rectum daily as needed.   ? calcium carbonate-vitamin D3 (CALCIUM 600 WITH VITAMIN D3) 600 mg(1,500mg) -400 unit cap Take 1 tablet by mouth daily.   ? calcium, as carbonate, (TUMS) 200 mg calcium (500 mg) chewable tablet Chew 1 tablet 3 (three) times a day as needed for heartburn.   ? cloNIDine HCl (CATAPRES) 0.2 MG tablet Take 0.4 mg by mouth 2 (two) times a day.          ? lisinopril (PRINIVIL,ZESTRIL) 5 MG tablet Take 40 mg by mouth daily.          ? senna-docusate (PERICOLACE) 8.6-50 mg tablet Take 2 tablets by mouth 2 (two) times a day.     Case Management:  I have reviewed the facility/SNF care plan/MDS which was done 10/19/19, including the falls risk, nutrition and pain screening. I also reviewed the current immunizations, and preventive care.. Future cancer screening is not clinically indicated secondary to age/goals of care.   Patient's desire to return to the community is not assessible due to cognitive impairment.    Information reviewed:  Medications, vital signs, orders, and nursing notes.    ASSESSMENT:      ICD-10-CM    1. Chronic pain syndrome G89.4    2. Atrial fibrillation, unspecified type (H) I48.91    3. Congestion of throat R68.89        PLAN:    Hypertension-lisinopril discontinued due to her chronic cough unrelieved by other interventions and patient started on metoprolol succinate 25 mg daily with close monitoring of his blood pressures also continue clonidine to 0.4 two times a day.      Atrial fibrillation - not on coumadin      Chronic pain continue Tylenol     Cough chronic reports clear secretions  lisinopril discontinued and metoprolol succinate started.  Other interventions were tried in the past with no relief.        Electronically signed by: Michelle Mitchell CNP

## 2021-06-19 NOTE — LETTER
Letter by Yojana Barahona MD at      Author: Yojana Barahona MD Service: -- Author Type: --    Filed:  Encounter Date: 6/27/2019 Status: (Other)         Patient: Kelvin Cheney   MR Number: [id number]   YOB: 1921   Date of Visit: 6/27/2019     [facility]    Facility:   [facility] [[id number]]   Code Status: DNR/DNI       Chief Complaint   Patient presents with   ? Review Of Multiple Medical Conditions     LT 6/27/19.       HPI:   Kelvin is a 89 y.o. male with hx of HTN, chronic pain, compression fractures, prostate cancer, seen for routine physician follow up in LT. He was hospitalized Aug 2018 for concern of pneumonia. Hx of multiple compression fractures, weakness and FTT. Ultimately admitted to LTC here at [facility].      Today:  No interim concerns since my last visit. He has some chronic pain for which he is on scheduled oxycodone and acetaminophen. Appears adequately controlled, no need to make changes. He is treated for HTN with lisinopril and clonidine. He denies headaches, dizziness or palpitations. He has occ cough which is not new. No shortness of breath or chest pain. No fever reported. He has not had any medication changes. No concerns per nursing. He is hard of hearing, likes to spend time in his room reading but also goes to some activities. He propels himself in his wheelchair.       Past Medical History:  Past Medical History:   Diagnosis Date   ? Aortic stenosis    ? Blind right eye    ? Cholelithiasis    ? Coronary artery disease     atherosclerotic aorta   ? Diverticular disease    ? Heart block AV third degree (H)    ? Heart murmur    ? Hyperlipidemia    ? Hypertension    ? Inguinal hernia     right   ? L1 vertebral fracture (H) 08/18/2014   ? Leukocytosis    ? Pacemaker    ? Pneumonia    ? Postoperative atrial fibrillation (H)     Had for few days after pacemaker placement   ? Prostate cancer (H)      prostate cancer, skin cancer   ? Syncope 01/2014   ? T12 vertebral fracture (H) 08/18/2014       Medications:  Current Outpatient Medications   Medication Sig   ? acetaminophen (TYLENOL) 325 MG tablet Take 650 mg by mouth 4 (four) times a day. Takes at 0200, 0800, 1400, and 2000.   ? bisacodyl (DULCOLAX) 10 mg suppository Insert 10 mg into the rectum daily as needed.   ? calcium, as carbonate, (TUMS) 200 mg calcium (500 mg) chewable tablet Chew 1 tablet 3 (three) times a day as needed for heartburn.   ? cloNIDine HCl (CATAPRES) 0.2 MG tablet Take 0.2 mg by mouth 2 (two) times a day.   ? leuprolide (LUPRON) 30 mg injection Inject 30 mg into the shoulder, thigh, or buttocks every 4 (four) months.   ? lisinopril (PRINIVIL,ZESTRIL) 5 MG tablet Take 40 mg by mouth daily.          ? oxyCODONE (ROXICODONE) 5 MG immediate release tablet Take 0.5 tablets (2.5 mg total) by mouth 3 (three) times a day. Takes at 0800, 1400, and 2000   ? oxyCODONE (ROXICODONE) 5 MG immediate release tablet Take 0.5 tablets (2.5 mg total) by mouth 2 (two) times a day as needed for pain.   ? senna-docusate (PERICOLACE) 8.6-50 mg tablet Take 2 tablets by mouth 2 (two) times a day.       Physical Exam:   General: Patient is alert, elderly male, [place], no distress.   Vitals: /86, Temp 98.2, Pulse 66, RR 20, O2 sat 98% RA  HEENT: Head is NCAT. Eyes show no injection or icterus. Nares negative. Oropharynx well hydrated.  Neck: Supple. No tenderness or adenopathy. No JVD.  Lungs: Diminished at bases. No wheezes.  Cardiovascular: Regular rate and rhythm, systolic murmur.  Back: No spinal or CVA tenderness.  Abdomen: Soft, no tenderness on exam. Bowel sounds present. No guarding rebound or rigidity.  Extremities: No LE edema.  Musculoskeletal: Age related degen changes.  Skin: Warm and dry.  Psych: Mood appears pretty good.      Labs:  Lab Results   Component Value Date    WBC 11.0 11/15/2018    HGB 12.6 (L) 11/15/2018    HCT 39.9 (L) 11/15/2018      (H) 11/15/2018     11/15/2018     Results for orders placed or performed in visit on 04/05/19   Basic Metabolic Panel   Result Value Ref Range    Sodium 138 136 - 145 mmol/L    Potassium 4.1 3.5 - 5.0 mmol/L    Chloride 103 98 - 107 mmol/L    CO2 30 22 - 31 mmol/L    Anion Gap, Calculation 5 5 - 18 mmol/L    Glucose 84 70 - 125 mg/dL    Calcium 10.1 8.5 - 10.5 mg/dL    BUN 16 8 - 28 mg/dL    Creatinine 0.67 (L) 0.70 - 1.30 mg/dL    GFR MDRD Af Amer >60 >60 mL/min/1.73m2    GFR MDRD Non Af Amer >60 >60 mL/min/1.73m2       Assessment/Plan:  1. General debility. Advanced age, DJD, hx of compression fractures.  2. Chronic pian. Stable on scheduled meds.  3. HTN. On lisinopril and clonidine. Continue to monitor.  4. Afib. Not on warfarin. Has adequate rate control.  5. Hx of prostate cancer. Saw urology in Feb, no new orders. RTC 6 months recommended.          Electronically signed by: Yojana Barahona MD

## 2021-06-19 NOTE — LETTER
Letter by Catie Schroeder EPS at      Author: Catie Schroeder EPS Service: -- Author Type: --    Filed:  Encounter Date: 6/24/2019 Status: (Other)         Kelvin Cheney  [address]  [facility] [address]      June 24, 2019      Dear Mr. Cheney    RE: Remote Results    We are writing to you regarding your recent Remote Pacemaker check from home. Your transmission was received successfully. Battery status is satisfactory at this time.     Your results are being reviewed.  You will be contacted if further information is necessary.     You are overdue for your clinic visit.  Please call now to schedule.    To schedule or reschedule, please call [phone] and press 1.    NOTE: If you would like to do an extra transmission, please call [phone] and press 3 to speak to a nurse BEFORE transmitting. This ensures that the Device Clinic staff is aware of the reason you are sending a transmission, and can follow-up with you after it has been reviewed.    We will be checking your implanted device from home (remotely) every three months unless otherwise instructed. We will need to see you in the clinic at least once a year. You may need to be seen in the clinic sooner depending on the results of your check.    Please be aware:    The follow-up schedule is like a Physician prescription.    Your remote monitor is paired to your specific implanted device.      Sincerely,    [facility]

## 2021-06-20 NOTE — LETTER
Letter by Laina Joe at      Author: Laina Joe Service: -- Author Type: --    Filed:  Encounter Date: 12/26/2019 Status: Signed         Kelvin Cheney  [address]  [place] [address]      December 26, 2019      Dear Mr. Cheney    RE: Remote Results    We are writing to you regarding your recent Remote Pacemaker check from home. Your transmission was received successfully. Battery status is satisfactory at this time.     Your results are showing no significant changes.    Your next device appointment will be a remote check on March 26, 2020; this will occur automatically.    To schedule or reschedule, please call [phone] and press 1.    NOTE: If you would like to do an extra transmission, please call [phone] and press 3 to speak to a nurse BEFORE transmitting. This ensures that the Device Clinic staff is aware of the reason you are sending a transmission, and can follow-up with you after it has been reviewed.    We will be checking your implanted device from home (remotely) every three months unless otherwise instructed. We will need to see you in the clinic at least once a year. You may need to be seen in the clinic sooner depending on the results of your check.    Please be aware:    The follow-up schedule is like a Physician prescription.    Your remote monitor is paired to your specific implanted device.      Sincerely,    [facility]

## 2021-06-20 NOTE — PROGRESS NOTES
[facility]      Facility:    [facility] [[id number]]  Code Status: DNR/DNI       Chief Complaint/Reason for Visit:  Chief Complaint   Patient presents with     H & P     New admit to LTC-weakness, failure to thrive.        HPI:   Kelvin is a 89 y.o. male with hx of HTN, chronic pain, compression fractures, prostate cancer, admitted to the hospital on 8/19/18 for weakness. Initial concern for pneumonia. Overall FTT, discharge summary as per below.     Hospital Summary:      #Weakness, progressive.  Failure to thrive.  Patient with chronic multiple compression fractures, not able to participate in therapy.  Family asked that therapy be stopped on the day of discharge.  Options for placement including hospice discussed with family.  -placement long-term care facility without hospice at this time  -falls precautions  -PT/OT discontinued per patient/family request      #Acute hypoxia.  Leukocytosis with no evidence of pneumonia or UTI.  Pro-calcitonin negative so antibiotics discontinued after one-time dose doxycycline.  CT PE negative for clot.  Patient afebrile throughout this hospital stay but does have a new oxygen requirement 3L O2 by NC, likely due to atelectasis with deconditioning.  -continue O2 by NC as required to maintain saturations >90%     Overall stabilized and discharged to [facility] on 8/21/18 for LTC.       Today:  He was admitted here for LTC with no therapy orders but we did order PT and OT for assessments and see how he could do. He is currently alli, not able to participate much. Per  there will be a Hospice consult and he will move to LTC. He has chronic pain, on scheduled opioids, sufficiently managed per his report. He came here on oxygen, subsequently has weaned, now on room air, no longer needs supplemental O2. Appetite is poor. He denies shortness of breath or chest pain. No diarrhea or constipation. Has difficulty  hearing, not new. 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       Past Medical History:  Past Medical History:   Diagnosis Date     Aortic stenosis      Blind right eye      Cholelithiasis      Coronary artery disease     atherosclerotic aorta     Diverticular disease      Heart block AV third degree (H)      Heart murmur      Hyperlipidemia      Hypertension      Inguinal hernia     right     L1 vertebral fracture (H) 08/18/2014     Leukocytosis      Pacemaker      Pneumonia      Postoperative atrial fibrillation (H)     Had for few days after pacemaker placement     Prostate cancer (H)     prostate cancer, skin cancer     Syncope 01/2014     T12 vertebral fracture (H) 08/18/2014           Surgical History:  Past Surgical History:   Procedure Laterality Date     CARDIAC PACEMAKER PLACEMENT  1/16/2014    Third degree AV block     CYSTOSCOPY N/A 1/9/2015    Procedure: CYSTOSCOPY, DILATION OF URETHRAL STRICTURE, CATHETER PLACEMENT AND  BIOPSY;  Surgeon: Matthew Veliz MD;  Location: [facility];  Service:      PROSTATECTOMY         Family History:   Family History   Problem Relation Age of Onset     Heart attack Father        Social History:    Social History     Social History     Marital status:      Spouse name: N/A     Number of children: N/A     Years of education: N/A     Social History Main Topics     Smoking status: Former Smoker     Years: 10.00     Quit date: 1/1/1951     Smokeless tobacco: Never Used     Alcohol use No      Comment: rarely     Drug use: No     Sexual activity: No     Other Topics Concern     Not on file     Social History Narrative    The patient lives in an assisted living facility.         Medications:  Current Outpatient Prescriptions   Medication Sig     acetaminophen (TYLENOL) 325 MG tablet Take 650 mg by mouth 4 (four) times a day. Takes at 0200, 0800, 1400, and 2000.     alendronate (FOSAMAX) 70 MG tablet Take 70 mg by mouth every 7 days. Takes on Sundays. Take in the  morning on an empty stomach with a full glass of water 30 minutes before food     Ca-D3-mag-zinc--susan-boron (CALTRATE 600+D PLUS MINERALS) 600 mg calcium- 800 unit-40 mg Chew Chew 1 tablet 2 (two) times a day.     cloNIDine HCl (CATAPRES) 0.2 MG tablet Take 0.2 mg by mouth 2 (two) times a day.     leuprolide (LUPRON) 30 mg injection Inject 30 mg into the shoulder, thigh, or buttocks every 4 (four) months.     lidocaine 4 % patch Place 1 patch on the skin at bedtime. Apply 1 patch to lumbar spine area at bedtime. Remove and discard patch in the morning.     lisinopril (PRINIVIL,ZESTRIL) 5 MG tablet Take 5 mg by mouth daily.     multivitamin (ONE A DAY) per tablet Take 1 tablet by mouth daily.     oxyCODONE (ROXICODONE) 5 MG immediate release tablet Take 0.5 tablets (2.5 mg total) by mouth 3 (three) times a day. Takes at 0800, 1400, and 2000     oxyCODONE (ROXICODONE) 5 MG immediate release tablet Take 0.5 tablets (2.5 mg total) by mouth 2 (two) times a day as needed for pain.     senna-docusate (PERICOLACE) 8.6-50 mg tablet Take 2 tablets by mouth 2 (two) times a day.       Allergies:  No Known Allergies      Review of Systems:  Pertinent items are noted in HPI.      Physical Exam:   General: Patient is alert, elderly male, [place], no distress.   Vitals: /83, Temp 97.2, Pulse 63, RR 18, O2 sat 92% RA.  HEENT: Head is NCAT. Eyes show no injection or icterus. Nares negative. Oropharynx well hydrated.  Neck: Supple. No tenderness or adenopathy. No JVD.  Lungs: Diminished at bases. No wheezes.  Cardiovascular: Regular rate and rhythm, systolic murmur.  Back: No spinal or CVA tenderness.  Abdomen: Soft, no tenderness on exam. Bowel sounds present. No guarding rebound or rigidity.  : Deferred.  Extremities: No LE edema is noted.  Musculoskeletal: Age related degen changes.  Skin: Warm and dry.  Psych: Mood appears pretty good.      Labs:  Lab Results   Component Value Date    WBC 13.0 (H) 08/20/2018    HGB 11.5  (L) 08/20/2018    HCT 34.4 (L) 08/20/2018    MCV 96 08/20/2018     08/21/2018     Results for orders placed or performed during the hospital encounter of 08/19/18   Basic metabolic panel   Result Value Ref Range    Sodium 136 136 - 145 mmol/L    Potassium 4.0 3.5 - 5.0 mmol/L    Chloride 98 98 - 107 mmol/L    CO2 24 22 - 31 mmol/L    Anion Gap, Calculation 14 5 - 18 mmol/L    Glucose 101 70 - 125 mg/dL    Calcium 10.4 8.5 - 10.5 mg/dL    BUN 14 8 - 28 mg/dL    Creatinine 0.69 (L) 0.70 - 1.30 mg/dL    GFR MDRD Af Amer >60 >60 mL/min/1.73m2    GFR MDRD Non Af Amer >60 >60 mL/min/1.73m2         Assessment/Plan:  1. Weakness. Overall failure to thrive, advanced age.   2. Chronic pain. On scheduled oxycodone. Hx multiple compression fractures.  3. HTN. BPs somewhat elevated, cont to monitor. May need to make adjustments.  4. Anemia. Labs as above.  5. Afib. Not on warfarin. Adequate rate control.  6. Hx prostate cancer.  7. Code status is DNR/DNI.        Total time greater than 50 minutes, greater than 50% counseling and coordination of care, time spent in interview and examination of patient, review of records, discussion with nursing staff.        Electronically signed by: Yojana Barahona MD

## 2021-06-20 NOTE — LETTER
Letter by Michelle Mitchell CNP at      Author: Michelle Mitchell CNP Service: -- Author Type: --    Filed:  Encounter Date: 4/14/2020 Status: (Other)         Patient: Kelvin Cheney   MR Number: [id number]   YOB: 1921   Date of Visit: 4/14/2020     [facility]    Facility:   [facility] [[id number]]   Code Status: DNR      CHIEF COMPLAINT/REASON FOR VISIT:  Chief Complaint   Patient presents with   ? FVP Care Coordination - Regulatory       HISTORY:      HPI: Kelvin is a 89 y.o. male  residing at [facility]. He is with history of chronic back pain ( on Tylenol), multiple chronic compression fractures. Hypertension, atrial fibrillation not on coumadin       Today he is seen for  a routine regulatory visit. He denied any discomfort.  He continues to have intermittent upper airway congestion and nasal congestion despite multiple interventions.  Today he was started on allergy medication.  He denied CP.  He denied HA. His weights have been stable over the last month. His BP's are stable and he is on clonidine 0.4 mg 2 times a day.    He is moving his bowels and had no issues with urination.     Past Medical History:   Diagnosis Date   ? Aortic stenosis    ? Blind right eye    ? Cholelithiasis    ? Coronary artery disease     atherosclerotic aorta   ? Diverticular disease    ? Heart block AV third degree (H)    ? Heart murmur    ? Hyperlipidemia    ? Hypertension    ? Inguinal hernia     right   ? L1 vertebral fracture (H) 08/18/2014   ? Leukocytosis    ? Pacemaker    ? Pneumonia    ? Postoperative atrial fibrillation (H)     Had for few days after pacemaker placement   ? Prostate cancer (H)     prostate cancer, skin cancer   ? Syncope 01/2014   ? T12 vertebral fracture (H) 08/18/2014             Family History   Problem Relation Age of Onset   ? Heart attack Father      Social History     Socioeconomic History   ? Marital status:       Spouse name: Not on file   ? Number of children: Not on file   ? Years of education: Not on file   ? Highest education level: Not on file   Occupational History   ? Not on file   Social Needs   ? Financial resource strain: Not on file   ? Food insecurity     Worry: Not on file     Inability: Not on file   ? Transportation needs     Medical: Not on file     Non-medical: Not on file   Tobacco Use   ? Smoking status: Former Smoker     Years: 10.00     Last attempt to quit: 1951     Years since quittin.3   ? Smokeless tobacco: Never Used   Substance and Sexual Activity   ? Alcohol use: No     Alcohol/week: 1.7 standard drinks     Types: 2 Standard drinks or equivalent per week     Comment: rarely   ? Drug use: No   ? Sexual activity: Never   Lifestyle   ? Physical activity     Days per week: Not on file     Minutes per session: Not on file   ? Stress: Not on file   Relationships   ? Social connections     Talks on phone: Not on file     Gets together: Not on file     Attends Mandaeism service: Not on file     Active member of club or organization: Not on file     Attends meetings of clubs or organizations: Not on file     Relationship status: Not on file   ? Intimate partner violence     Fear of current or ex partner: Not on file     Emotionally abused: Not on file     Physically abused: Not on file     Forced sexual activity: Not on file   Other Topics Concern   ? Not on file   Social History Narrative    The patient lives in an assisted living facility.          Review of Systems  Constitutional: Positive for fatigue. Negative for activity change, appetite change, chills and fever.   HENT: Positive for congestion and hearing loss. Negative for sore throat.         Wears HA    Eyes: Positive for visual disturbance.        Poor vision    Respiratory: Positive for cough. Negative for shortness of breath and wheezing.    Cardiovascular: Negative for chest pain and leg swelling.   Gastrointestinal: Negative  for abdominal distention, abdominal pain, diarrhea and nausea.   Genitourinary: Negative for dysuria.   Musculoskeletal: Negative for arthralgias and myalgias.   Skin: Negative for color change, rash and wound.   Neurological: Positive for weakness. Negative for dizziness and numbness.   Psychiatric/Behavioral: Negative for agitation, behavioral problems, confusion and sleep disturbance. .  Vitals:    04/14/20 1225   BP: 139/79   Pulse: 62   Resp: 20   Temp: 99  F (37.2  C)   SpO2: 91%   Weight: 170 lb (77.1 kg)       Physical Exam    Pulmonary/Chest: Upper airway congestion nasal congestion   Intermittent constitutional: He is oriented to person, place, and time. He appears well-developed and well-nourished.   HENT:   Head: Normocephalic.   Eyes: Conjunctivae are normal.   Neck: Normal range of motion.   Cardiovascular: Normal rate and regular rhythm.   Murmur heard.  pacemaker   Pulmonary/Chest: No respiratory distress. He has no wheezes.   Abdominal: Soft. Bowel sounds are normal. He exhibits no distension. There is no tenderness.   Musculoskeletal: Normal range of motion. He exhibits no edema.   Neurological: He is alert and oriented to person, place, and time.   Skin: Skin is warm and dry.   Psychiatric: He has a normal mood and affect. His behavior is normal.      LABS:   No results found for this or any previous visit (from the past 240 hour(s)).     Current Outpatient Medications   Medication Sig   ? acetaminophen (TYLENOL) 325 MG tablet Take 650 mg by mouth 4 (four) times a day. Takes at 0200, 0800, 1400, and 2000.   ? bisacodyl (DULCOLAX) 10 mg suppository Insert 10 mg into the rectum daily as needed.   ? calcium carbonate-vitamin D3 (CALCIUM 600 WITH VITAMIN D3) 600 mg(1,500mg) -400 unit cap Take 1 tablet by mouth daily.   ? calcium, as carbonate, (TUMS) 200 mg calcium (500 mg) chewable tablet Chew 1 tablet 3 (three) times a day as needed for heartburn.   ? cloNIDine HCl (CATAPRES) 0.2 MG tablet Take 0.4  mg by mouth 2 (two) times a day.          ? loratadine (CLARITIN) 10 mg tablet Take 10 mg by mouth daily.   ? metoprolol succinate (TOPROL-XL) 25 MG Take 25 mg by mouth daily.   ? senna-docusate (PERICOLACE) 8.6-50 mg tablet Take 2 tablets by mouth 2 (two) times a day.     Case Management:  I have reviewed the facility/SNF care plan/MDS which was done on 1/27/2020 and is due again on 4/23/2020   including the falls risk, nutrition and pain screening. I also reviewed the current immunizations, and preventive care.. Future cancer screening is not clinically indicated secondary to age/goals of care.   Patient's desire to return to the community is not assessible due to cognitive impairment.    Information reviewed:  Medications, vital signs, orders, and nursing notes.    ASSESSMENT:      ICD-10-CM    1. Benign Essential Hypertension  I10    2. Cough  R05        PLAN:    Hypertension nmetoprolol succinate 25 mg daily and  continue clonidine to 0.4 two times a day.      Atrial fibrillation - not on coumadin      Chronic pain continue Tylenol     Cough chronic prior interventions were unsuccessful including discontinuation of his lisinopril Claritin started today       Electronically signed by: Michelle Mitchell, CNP

## 2021-06-20 NOTE — LETTER
Letter by Tayla Khoury RN at      Author: Tayla Khoury RN Service: -- Author Type: --    Filed:  Encounter Date: 3/26/2020 Status: (Other)         Kelvin Cheney  [address]  [facility] [facility] [address]      March 26, 2020      Dear Mr. Cheney,    RE: Remote Results    We are writing to you regarding your recent Remote Pacemaker check from home. Your transmission was received successfully. Battery status is satisfactory at this time.     Your results are within normal limits.    Your next device appointment will be a clinic visit.  Please call in middle of April to schedule.      To schedule or reschedule, please call [phone] and press 1.    NOTE: If you would like to do an extra transmission, please call [phone] and press 3 to speak to a nurse BEFORE transmitting. This ensures that the Device Clinic staff is aware of the reason you are sending a transmission, and can follow-up with you after it has been reviewed.    We will be checking your implanted device from home (remotely) every three months unless otherwise instructed. We will need to see you in the clinic at least once a year. You may need to be seen in the clinic sooner depending on the results of your check.    Please be aware:    The follow-up schedule is like a Physician prescription.    Your remote monitor is paired to your specific implanted device.      Sincerely,    [facility]

## 2021-06-20 NOTE — LETTER
Letter by Hazel Lara RN at      Author: Hazel Lara RN Service: -- Author Type: --    Filed:  Encounter Date: 7/6/2020 Status: (Other)         Kelvin Cheney  [address]  [facility] [address]      July 6, 2020      Dear Mr. Cheney,    RE: Remote Results    We are writing to you regarding your recent Remote Pacemaker check from home. Your transmission was received successfully. Battery status is satisfactory at this time.     Your results are within normal limits.    Your next device appointment will be a remote check on 10/5/2020; this will occur automatically.    To schedule or reschedule, please call [phone] and press 1.    NOTE: If you would like to do an extra transmission, please call [phone] and press 3 to speak to a nurse BEFORE transmitting. This ensures that the Device Clinic staff is aware of the reason you are sending a transmission, and can follow-up with you after it has been reviewed.    We will be checking your implanted device from home (remotely) every three months unless otherwise instructed. We will need to see you in the clinic at least once a year. You may need to be seen in the clinic sooner depending on the results of your check.    Please be aware:    The follow-up schedule is like a Physician prescription.    Your remote monitor is paired to your specific implanted device.      Sincerely,    [facility]

## 2021-06-20 NOTE — LETTER
Letter by Laina Joe at      Author: Laina Joe Service: -- Author Type: --    Filed:  Encounter Date: 10/5/2020 Status: (Other)         Kelvin Cheney  [address]  [facility] [address]      October 5, 2020      Dear Mr. Cheney    RE: Remote Results    We are writing to you regarding your recent Remote Pacemaker check from home. Your transmission was received successfully. Battery status is satisfactory at this time.     Your results are showing no significant changes.    Your next device appointment will be a remote check on January 5, 2021; this will occur automatically.    To schedule or reschedule, please call [phone] and press 1.    NOTE: If you would like to do an extra transmission, please call [phone] and press 3 to speak to a nurse BEFORE transmitting. This ensures that the Device Clinic staff is aware of the reason you are sending a transmission, and can follow-up with you after it has been reviewed.    We will be checking your implanted device from home (remotely) every three months unless otherwise instructed. We will need to see you in the clinic at least once a year. You may need to be seen in the clinic sooner depending on the results of your check.    Please be aware:    The follow-up schedule is like a Physician prescription.    Your remote monitor is paired to your specific implanted device.      Sincerely,    [facility]

## 2021-06-20 NOTE — LETTER
Letter by Yojana Barahona MD at      Author: Yojana Barahona MD Service: -- Author Type: --    Filed:  Encounter Date: 2/25/2020 Status: (Other)         Patient: Kelvin Cheney   MR Number: [id number]   YOB: 1921   Date of Visit: 2/25/2020     [facility]    Facility:   [facility] [[id number]]   Code Status: DNR/DNI       Chief Complaint   Patient presents with   ? Review Of Multiple Medical Conditions     LT 2/25/20.       HPI:   Kelvin is a 89 y.o. male with hx of HTN, chronic pain, compression fractures, prostate cancer, seen for routine physician follow up in LT. He was hospitalized Aug 2018 for concern of pneumonia. Hx of multiple compression fractures, weakness and FTT. Ultimately admitted to LT here at [facility].      Today:  No interim concerns since my last visit. He has been fairly stable per nursing report. Has chronic pain for which he is on scheduled oxycodone and acetaminophen. Appears adequately controlled. He is treated for HTN with metoprolol and clonidine. He denies headaches, dizziness or palpitations. He denies cough today. No shortness of breath or chest pain. No fever reported. He has not had any medication changes. He is hard of hearing, likes to spend time in his room reading but also goes to some activities. He propels himself in his wheelchair.       Past Medical History:  Past Medical History:   Diagnosis Date   ? Aortic stenosis    ? Blind right eye    ? Cholelithiasis    ? Coronary artery disease     atherosclerotic aorta   ? Diverticular disease    ? Heart block AV third degree (H)    ? Heart murmur    ? Hyperlipidemia    ? Hypertension    ? Inguinal hernia     right   ? L1 vertebral fracture (H) 08/18/2014   ? Leukocytosis    ? Pacemaker    ? Pneumonia    ? Postoperative atrial fibrillation (H)     Had for few days after pacemaker placement   ? Prostate cancer (H)     prostate cancer, skin cancer    ? Syncope 01/2014   ? T12 vertebral fracture (H) 08/18/2014       Medications:  Current Outpatient Medications   Medication Sig   ? acetaminophen (TYLENOL) 325 MG tablet Take 650 mg by mouth 4 (four) times a day. Takes at 0200, 0800, 1400, and 2000.   ? bisacodyl (DULCOLAX) 10 mg suppository Insert 10 mg into the rectum daily as needed.   ? calcium carbonate-vitamin D3 (CALCIUM 600 WITH VITAMIN D3) 600 mg(1,500mg) -400 unit cap Take 1 tablet by mouth daily.   ? calcium, as carbonate, (TUMS) 200 mg calcium (500 mg) chewable tablet Chew 1 tablet 3 (three) times a day as needed for heartburn.   ? cloNIDine HCl (CATAPRES) 0.2 MG tablet Take 0.4 mg by mouth 2 (two) times a day.          ? metoprolol succinate (TOPROL-XL) 25 MG Take 25 mg by mouth daily.   ? senna-docusate (PERICOLACE) 8.6-50 mg tablet Take 2 tablets by mouth 2 (two) times a day.       Physical Exam:   General: Patient is alert, elderly male, [place], no distress.   Vitals: /76, Temp 98.3, Pulse 64, RR 18.  HEENT: Head is NCAT. Eyes show no injection or icterus. Nares negative. Oropharynx well hydrated.  Neck: Supple. No tenderness or adenopathy. No JVD.  Lungs: Diminished at bases. No wheezes.  Cardiovascular: Regular rate and rhythm, systolic murmur.  Back: No spinal or CVA tenderness.  Abdomen: Soft, no tenderness on exam. Bowel sounds present. No guarding rebound or rigidity.  Extremities: No LE edema.  Musculoskeletal: Age related degen changes.  Skin: Warm and dry.  Psych: Mood appears pretty good.      Labs:  Lab Results   Component Value Date    WBC 15.4 (H) 12/16/2019    HGB 12.1 (L) 12/16/2019    HCT 37.2 (L) 12/16/2019    MCV 98 12/16/2019     12/16/2019     Results for orders placed or performed in visit on 04/05/19   Basic Metabolic Panel   Result Value Ref Range    Sodium 138 136 - 145 mmol/L    Potassium 4.1 3.5 - 5.0 mmol/L    Chloride 103 98 - 107 mmol/L    CO2 30 22 - 31 mmol/L    Anion Gap, Calculation 5 5 - 18 mmol/L     Glucose 84 70 - 125 mg/dL    Calcium 10.1 8.5 - 10.5 mg/dL    BUN 16 8 - 28 mg/dL    Creatinine 0.67 (L) 0.70 - 1.30 mg/dL    GFR MDRD Af Amer >60 >60 mL/min/1.73m2    GFR MDRD Non Af Amer >60 >60 mL/min/1.73m2       Assessment/Plan:  1. Debilitation. Advanced age, DJD, hx of compression fractures. Requires assist from LTC staff.   2. Chronic pain. Stable on scheduled meds, continue as written.  3. HTN. Overall acceptable, Continue metoprolol and clonidine.   4. Afib. Not on warfarin. Adequate rate control.  5. Hx of prostate cancer. Sees urology.           Electronically signed by: Yojana Barahona MD

## 2021-06-20 NOTE — PROGRESS NOTES
[facility]    Facility:   [facility] [[id number]]   Code Status: DNR/DNI      CHIEF COMPLAINT/REASON FOR VISIT:  Chief Complaint   Patient presents with     Problem Visit     hypertension       HISTORY:      HPI: Kelvin is a 89 y.o. male residing at [facility]. He is with history of chronic back pain ( on oxycodone), multiple chronic compression fractures. Hypertension, atrial fibrillation not on coumadin He is new to this facility. He was admitted to [facility] on 8/21/18. He was recently hospitalized on 8/19 for failure to thrive,  weakness and pneumonia      Today he is seen for reports of elevated blood pressures.He was seen in his room. He denied CP, shortness of breath, he denied HA.  He denied back pain today. He did report constipation x 2 days. He declined miralax and wanted it discontinued. He is currently on senna -s 2 tablets two times a day and will receive a suppository today. His Blood pressures were reviewed and they have been 's to  180's. His lisinopril was increased to 20 mg daily. His weights have been stable. He had no concerns.     Past Medical History:   Diagnosis Date     Aortic stenosis      Blind right eye      Cholelithiasis      Coronary artery disease     atherosclerotic aorta     Diverticular disease      Heart block AV third degree (H)      Heart murmur      Hyperlipidemia      Hypertension      Inguinal hernia     right     L1 vertebral fracture (H) 08/18/2014     Leukocytosis      Pacemaker      Pneumonia      Postoperative atrial fibrillation (H)     Had for few days after pacemaker placement     Prostate cancer (H)     prostate cancer, skin cancer     Syncope 01/2014     T12 vertebral fracture (H) 08/18/2014             Family History   Problem Relation Age of Onset     Heart attack Father      Social History     Social History     Marital status:      Spouse name: N/A     Number of children: N/A     Years of  education: N/A     Social History Main Topics     Smoking status: Former Smoker     Years: 10.00     Quit date: 1/1/1951     Smokeless tobacco: Never Used     Alcohol use No      Comment: rarely     Drug use: No     Sexual activity: No     Other Topics Concern     Not on file     Social History Narrative    The patient lives in an assisted living facility.          Review of Systems   Constitutional: Positive for fatigue. Negative for activity change, appetite change, chills and fever.   HENT: Positive for hearing loss. Negative for congestion and sore throat.         Wears HA    Eyes: Positive for visual disturbance.        Poor vision    Respiratory: Negative for cough, shortness of breath and wheezing.    Cardiovascular: Negative for chest pain and leg swelling.   Gastrointestinal: Positive for constipation. Negative for abdominal distention, abdominal pain, diarrhea and nausea.   Genitourinary: Negative for dysuria.   Musculoskeletal: Negative for arthralgias and myalgias.   Skin: Negative for color change, rash and wound.   Neurological: Positive for weakness. Negative for dizziness and numbness.   Psychiatric/Behavioral: Negative for agitation, behavioral problems, confusion and sleep disturbance.       .  Vitals:    09/26/18 1218   BP: (!) 182/98   Pulse: 69   Resp: 20   Temp: 98.2  F (36.8  C)   SpO2: 97%   Weight: 160 lb 12.8 oz (72.9 kg)       Physical Exam   Constitutional: He is oriented to person, place, and time. He appears well-developed and well-nourished.   HENT:   Head: Normocephalic.   Eyes: Conjunctivae are normal.   Neck: Normal range of motion.   Cardiovascular: Normal rate and regular rhythm.    Murmur heard.  pacemaker   Pulmonary/Chest: No respiratory distress. He has no wheezes. He has rales.   Abdominal: Soft. Bowel sounds are normal. He exhibits no distension. There is no tenderness.   Musculoskeletal: Normal range of motion. He exhibits no edema.   Neurological: He is alert and oriented  to person, place, and time.   Skin: Skin is warm and dry.   Psychiatric: He has a normal mood and affect. His behavior is normal.         LABS:   No results found for this or any previous visit (from the past 240 hour(s)).  Current Outpatient Prescriptions   Medication Sig     acetaminophen (TYLENOL) 325 MG tablet Take 650 mg by mouth 4 (four) times a day. Takes at 0200, 0800, 1400, and 2000.     bisacodyl (DULCOLAX) 10 mg suppository Insert 10 mg into the rectum daily as needed.     calcium, as carbonate, (TUMS) 200 mg calcium (500 mg) chewable tablet Chew 1 tablet 3 (three) times a day as needed for heartburn.     cloNIDine HCl (CATAPRES) 0.2 MG tablet Take 0.2 mg by mouth 2 (two) times a day.     leuprolide (LUPRON) 30 mg injection Inject 30 mg into the shoulder, thigh, or buttocks every 4 (four) months.     lisinopril (PRINIVIL,ZESTRIL) 5 MG tablet Take 20 mg by mouth daily.      oxyCODONE (ROXICODONE) 5 MG immediate release tablet Take 0.5 tablets (2.5 mg total) by mouth 3 (three) times a day. Takes at 0800, 1400, and 2000     oxyCODONE (ROXICODONE) 5 MG immediate release tablet Take 0.5 tablets (2.5 mg total) by mouth 2 (two) times a day as needed for pain.     senna-docusate (PERICOLACE) 8.6-50 mg tablet Take 2 tablets by mouth 2 (two) times a day.     ASSESSMENT:      ICD-10-CM    1. Generalized muscle weakness M62.81    2. Benign Essential Hypertension I10    3. Back pain M54.9        PLAN:    Hypertension- increase lisinopril to 20 mg daily  Constipation- dc miralax per pt's request, continue senna 2 tabs two times a day and give PRN suppository today  Back pain- denied today on oxycodone   Weakness. Overall failure to thrive, advanced age.   Atrial fibrillation - not on coumadin     Electronically signed by: Michelle Mitchell, CNP

## 2021-06-20 NOTE — LETTER
Letter by Michelle Mitchell CNP at      Author: Michelle Mitchell CNP Service: -- Author Type: --    Filed:  Encounter Date: 12/16/2019 Status: Signed         Patient: Kelvin Cheney   MR Number: [id number]   YOB: 1921   Date of Visit: 12/16/2019     [facility]    Facility:   [facility] [[id number]]   Code Status: DNR      CHIEF COMPLAINT/REASON FOR VISIT:  Chief Complaint   Patient presents with   ? Problem Visit     cough, fever, crackles        HISTORY:      HPI: Kelvin is a 89 y.o. male  residing at [facility]. He is with history of chronic back pain ( on Tylenol), multiple chronic compression fractures. Hypertension, atrial fibrillation not on coumadin       Today he is seen for  reports of fever, cough and crackles.  He was found to have crackles throughout all fields and a temp of 99.7 however over the weekend he was 100.3.  He reports the fever started started over the weekend. He has alway had a chronic cough and multiple modalities were initiated along with previous chest x-rays.  His lisinopril was also changed due to chronic cough.   His weights have been stable over the last month.   He is moving his bowels and had no issues with urination. He denied pain and had no lower extremity edema         Past Medical History:   Diagnosis Date   ? Aortic stenosis    ? Blind right eye    ? Cholelithiasis    ? Coronary artery disease     atherosclerotic aorta   ? Diverticular disease    ? Heart block AV third degree (H)    ? Heart murmur    ? Hyperlipidemia    ? Hypertension    ? Inguinal hernia     right   ? L1 vertebral fracture (H) 08/18/2014   ? Leukocytosis    ? Pacemaker    ? Pneumonia    ? Postoperative atrial fibrillation (H)     Had for few days after pacemaker placement   ? Prostate cancer (H)     prostate cancer, skin cancer   ? Syncope 01/2014   ? T12 vertebral fracture (H) 08/18/2014             Family History   Problem Relation  Age of Onset   ? Heart attack Father      Social History     Socioeconomic History   ? Marital status:      Spouse name: Not on file   ? Number of children: Not on file   ? Years of education: Not on file   ? Highest education level: Not on file   Occupational History   ? Not on file   Social Needs   ? Financial resource strain: Not on file   ? Food insecurity:     Worry: Not on file     Inability: Not on file   ? Transportation needs:     Medical: Not on file     Non-medical: Not on file   Tobacco Use   ? Smoking status: Former Smoker     Years: 10.00     Last attempt to quit: 1951     Years since quittin.0   ? Smokeless tobacco: Never Used   Substance and Sexual Activity   ? Alcohol use: No     Alcohol/week: 1.7 standard drinks     Types: 2 Standard drinks or equivalent per week     Comment: rarely   ? Drug use: No   ? Sexual activity: Never   Lifestyle   ? Physical activity:     Days per week: Not on file     Minutes per session: Not on file   ? Stress: Not on file   Relationships   ? Social connections:     Talks on phone: Not on file     Gets together: Not on file     Attends Anabaptism service: Not on file     Active member of club or organization: Not on file     Attends meetings of clubs or organizations: Not on file     Relationship status: Not on file   ? Intimate partner violence:     Fear of current or ex partner: Not on file     Emotionally abused: Not on file     Physically abused: Not on file     Forced sexual activity: Not on file   Other Topics Concern   ? Not on file   Social History Narrative    The patient lives in an assisted living facility.          Review of Systems   Constitutional: Positive for fatigue and fever.     Constitutional: Positive for fatigue. Negative for activity change, appetite change, chills and fever.   HENT: Positive for congestion and hearing loss. Negative for sore throat.         Wears HA    Eyes: Positive for visual disturbance.        Poor  vision    Respiratory: Positive for cough. Negative for shortness of breath and wheezing.    Cardiovascular: Negative for chest pain and leg swelling.   Gastrointestinal: Negative for abdominal distention, abdominal pain, diarrhea and nausea.   Genitourinary: Negative for dysuria.   Musculoskeletal: Negative for arthralgias and myalgias.   Skin: Negative for color change, rash and wound.   Neurological: Positive for weakness. Negative for dizziness and numbness.   Psychiatric/Behavioral: Negative for agitation, behavioral problems, confusion and sleep disturbance. .  Vitals:    12/16/19 1135   BP: 146/68   Pulse: 100   Resp: 20   Temp: 99.7  F (37.6  C)   SpO2: 92%   Weight: 170 lb (77.1 kg)       Physical Exam  Pulmonary:      Breath sounds: Rales present.      Comments: Bilateral lower lobes      Constitutional: He is oriented to person, place, and time. He appears well-developed and well-nourished.   HENT:   Head: Normocephalic.   Eyes: Conjunctivae are normal.   Neck: Normal range of motion.   Cardiovascular: Normal rate and regular rhythm.   Murmur heard.  pacemaker   Pulmonary/Chest: No respiratory distress. He has no wheezes.   Abdominal: Soft. Bowel sounds are normal. He exhibits no distension. There is no tenderness.   Musculoskeletal: Normal range of motion. He exhibits no edema.   Neurological: He is alert and oriented to person, place, and time.   Skin: Skin is warm and dry.   Psychiatric: He has a normal mood and affect. His behavior is normal.      LABS:   Recent Results (from the past 240 hour(s))   HM2(CBC w/o Differential)   Result Value Ref Range    WBC 15.4 (H) 4.0 - 11.0 thou/uL    RBC 3.79 (L) 4.40 - 6.20 mill/uL    Hemoglobin 12.1 (L) 14.0 - 18.0 g/dL    Hematocrit 37.2 (L) 40.0 - 54.0 %    MCV 98 80 - 100 fL    MCH 31.9 27.0 - 34.0 pg    MCHC 32.5 32.0 - 36.0 g/dL    RDW 12.6 11.0 - 14.5 %    Platelets 208 140 - 440 thou/uL    MPV 10.4 8.5 - 12.5 fL        Current Outpatient Medications    Medication Sig   ? metoprolol succinate (TOPROL-XL) 25 MG Take 25 mg by mouth daily.   ? acetaminophen (TYLENOL) 325 MG tablet Take 650 mg by mouth 4 (four) times a day. Takes at 0200, 0800, 1400, and 2000.   ? bisacodyl (DULCOLAX) 10 mg suppository Insert 10 mg into the rectum daily as needed.   ? calcium carbonate-vitamin D3 (CALCIUM 600 WITH VITAMIN D3) 600 mg(1,500mg) -400 unit cap Take 1 tablet by mouth daily.   ? calcium, as carbonate, (TUMS) 200 mg calcium (500 mg) chewable tablet Chew 1 tablet 3 (three) times a day as needed for heartburn.   ? cloNIDine HCl (CATAPRES) 0.2 MG tablet Take 0.4 mg by mouth 2 (two) times a day.          ? senna-docusate (PERICOLACE) 8.6-50 mg tablet Take 2 tablets by mouth 2 (two) times a day.       ASSESSMENT:      ICD-10-CM    1. Fever, unspecified fever cause R50.9    2. Bibasilar crackles R09.89    3. Cough R05        PLAN:    Cough, crackles, fever- Chest Xray A/P lateral and CBC    Hypertension metoprolol succinate 25 mg daily.   continue clonidine to 0.4 two times a day.      Atrial fibrillation - not on coumadin      Chronic pain continue Tylenol         Electronically signed by: Michelle Mitchell CNP

## 2021-06-20 NOTE — LETTER
Letter by Yojana Barahona MD at      Author: Yojana Barahona MD Service: -- Author Type: --    Filed:  Encounter Date: 6/30/2020 Status: (Other)         Patient: Kelvin Cheney   MR Number: [id number]   YOB: 1921   Date of Visit: 6/30/2020     [facility]    Facility:   [facility] [[id number]]   Code Status: DNR/DNI       Chief Complaint   Patient presents with   ? Review Of Multiple Medical Conditions     LT 6/30/2020. HTN, chronic pain, general debility.       HPI:   Kelvin is a 89 y.o. male with hx of HTN, chronic pain, compression fractures, prostate cancer, seen for routine physician follow up in LT. He was hospitalized Aug 2018 for concern of pneumonia. Hx of multiple compression fractures, weakness and FTT. Ultimately admitted to LT here at [facility].      Today:  He has been pretty stable. No acute concerns. He had a fall 5/17/20 without injury, no falls since. No skin issues. Appetite has been able. He is very [place], has been doing some video visits with family due to coronavirus related visitor restrictions. Has chronic pain for which he is on scheduled acetaminophen. Appears adequately controlled. He is treated for HTN with metoprolol and clonidine. BPs 110-171 systolic. He denies headaches, dizziness or palpitations. No shortness of breath or chest pain. No fever reported. He propels himself in his wheelchair.       Past Medical History:  Past Medical History:   Diagnosis Date   ? Aortic stenosis    ? Blind right eye    ? Cholelithiasis    ? Coronary artery disease     atherosclerotic aorta   ? Diverticular disease    ? Heart block AV third degree (H)    ? Heart murmur    ? Hyperlipidemia    ? Hypertension    ? Inguinal hernia     right   ? L1 vertebral fracture (H) 08/18/2014   ? Leukocytosis    ? Pacemaker    ? Pneumonia    ? Postoperative atrial fibrillation (H)     Had for few days after pacemaker placement   ?  Prostate cancer (H)     prostate cancer, skin cancer   ? Syncope 01/2014   ? T12 vertebral fracture (H) 08/18/2014       Medications:  Current Outpatient Medications   Medication Sig   ? acetaminophen (TYLENOL) 325 MG tablet Take 650 mg by mouth 4 (four) times a day. Takes at 0200, 0800, 1400, and 2000.   ? bisacodyl (DULCOLAX) 10 mg suppository Insert 10 mg into the rectum daily as needed.   ? calcium carbonate-vitamin D3 (CALCIUM 600 WITH VITAMIN D3) 600 mg(1,500mg) -400 unit cap Take 1 tablet by mouth daily.   ? calcium, as carbonate, (TUMS) 200 mg calcium (500 mg) chewable tablet Chew 1 tablet 3 (three) times a day as needed for heartburn.   ? cloNIDine HCl (CATAPRES) 0.2 MG tablet Take 0.4 mg by mouth 2 (two) times a day.          ? loratadine (CLARITIN) 10 mg tablet Take 10 mg by mouth daily.   ? metoprolol succinate (TOPROL-XL) 25 MG Take 25 mg by mouth daily.   ? senna-docusate (PERICOLACE) 8.6-50 mg tablet Take 2 tablets by mouth 2 (two) times a day.       Physical Exam:   Note: COVID-19 pandemic precautions in place. Physical exam performed with social distancing considerations.  General: Patient is alert, elderly male, [place], no distress.   Vitals: /74, Temp 98.9, Pulse 65, RR 18, O2 sat 95%RA.  HEENT: Head is NCAT. Eyes show no injection or icterus. Nares negative. Oropharynx well hydrated.  Neck: No JVD.  Lungs: Non labored respirations.  Abdomen: Soft.  Extremities: No LE edema.  Musculoskeletal: Age related degen changes.  Skin: No open areas.   Psych: Mood appears pretty good.      Labs:  Lab Results   Component Value Date    WBC 15.4 (H) 12/16/2019    HGB 12.1 (L) 12/16/2019    HCT 37.2 (L) 12/16/2019    MCV 98 12/16/2019     12/16/2019     Results for orders placed or performed in visit on 04/05/19   Basic Metabolic Panel   Result Value Ref Range    Sodium 138 136 - 145 mmol/L    Potassium 4.1 3.5 - 5.0 mmol/L    Chloride 103 98 - 107 mmol/L    CO2 30 22 - 31 mmol/L    Anion Gap,  Calculation 5 5 - 18 mmol/L    Glucose 84 70 - 125 mg/dL    Calcium 10.1 8.5 - 10.5 mg/dL    BUN 16 8 - 28 mg/dL    Creatinine 0.67 (L) 0.70 - 1.30 mg/dL    GFR MDRD Af Amer >60 >60 mL/min/1.73m2    GFR MDRD Non Af Amer >60 >60 mL/min/1.73m2       Assessment/Plan:  1. Debilitation. Advanced age, DJD, hx of compression fractures. Resides in LTC.  2. Chronic pain. Stable on scheduled tylenol.  3. HTN. Overall acceptable, Continue metoprolol and clonidine.   4. Afib. Not on warfarin. Adequate rate control.  5. Hx of prostate cancer. Sees urology.     Overall he appears stable and no new orders are needed at this time.         Electronically signed by: Yojana Barahona MD

## 2021-06-20 NOTE — LETTER
Letter by Michelle Mitchell CNP at      Author: Michelle Mitchell CNP Service: -- Author Type: --    Filed:  Encounter Date: 8/5/2020 Status: (Other)         Patient: Kelvin Cheney   MR Number: [id number]   YOB: 1921   Date of Visit: 8/5/2020     [facility]    Facility:   [facility] [[id number]]   Code Status: DNR      CHIEF COMPLAINT/REASON FOR VISIT:  Chief Complaint   Patient presents with   ? FVP Care Coordination - Regulatory       HISTORY:      HPI: Kelvin is a 89 y.o. male  residing at [facility]. He is with history of chronic back pain ( on Tylenol), multiple chronic compression fractures. Hypertension, atrial fibrillation not on coumadin       Today he is seen for  a routine regulatory visit. He denied any discomfort.  .  He denied CP.  He denied HA. His weights have been stable over the last month. His BP's are stable.    He is moving his bowels and had no issues with urination.  He has no active coughing or congestion at this time.  Vital signs are stable and he tells me he feels good.  Staff has no concerns    Past Medical History:   Diagnosis Date   ? Aortic stenosis    ? Blind right eye    ? Cholelithiasis    ? Coronary artery disease     atherosclerotic aorta   ? Diverticular disease    ? Heart block AV third degree (H)    ? Heart murmur    ? Hyperlipidemia    ? Hypertension    ? Inguinal hernia     right   ? L1 vertebral fracture (H) 08/18/2014   ? Leukocytosis    ? Pacemaker    ? Pneumonia    ? Postoperative atrial fibrillation (H)     Had for few days after pacemaker placement   ? Prostate cancer (H)     prostate cancer, skin cancer   ? Syncope 01/2014   ? T12 vertebral fracture (H) 08/18/2014             Family History   Problem Relation Age of Onset   ? Heart attack Father      Social History     Socioeconomic History   ? Marital status:      Spouse name: Not on file   ? Number of children: Not on file   ? Years  of education: Not on file   ? Highest education level: Not on file   Occupational History   ? Not on file   Social Needs   ? Financial resource strain: Not on file   ? Food insecurity     Worry: Not on file     Inability: Not on file   ? Transportation needs     Medical: Not on file     Non-medical: Not on file   Tobacco Use   ? Smoking status: Former Smoker     Years: 10.00     Last attempt to quit: 1951     Years since quittin.6   ? Smokeless tobacco: Never Used   Substance and Sexual Activity   ? Alcohol use: No     Alcohol/week: 1.7 standard drinks     Types: 2 Standard drinks or equivalent per week     Comment: rarely   ? Drug use: No   ? Sexual activity: Never   Lifestyle   ? Physical activity     Days per week: Not on file     Minutes per session: Not on file   ? Stress: Not on file   Relationships   ? Social connections     Talks on phone: Not on file     Gets together: Not on file     Attends Synagogue service: Not on file     Active member of club or organization: Not on file     Attends meetings of clubs or organizations: Not on file     Relationship status: Not on file   ? Intimate partner violence     Fear of current or ex partner: Not on file     Emotionally abused: Not on file     Physically abused: Not on file     Forced sexual activity: Not on file   Other Topics Concern   ? Not on file   Social History Narrative    The patient lives in an assisted living facility.          Review of Systems  Constitutional: Positive for fatigue. Negative for activity change, appetite change, chills and fever.   HENT: Positive for  hearing loss. Negative for sore throat.         Wears HA    Eyes: Positive for visual disturbance.        Poor vision    Respiratory:. Negative for shortness of breath and wheezing.    Cardiovascular: Negative for chest pain and leg swelling.   Gastrointestinal: Negative for abdominal distention, abdominal pain, diarrhea and nausea.   Genitourinary: Negative for dysuria.    Musculoskeletal: Negative for arthralgias and myalgias.   Skin: Negative for color change, rash and wound.   Neurological: Positive for weakness. Negative for dizziness and numbness.   Psychiatric/Behavioral: Negative for agitation, behavioral problems, confusion and sleep disturbance. .  Vitals:    08/05/20 1014   BP: 124/68   Pulse: 74   Resp: 20   Temp: 98.4  F (36.9  C)   SpO2: 94%   Weight: 170 lb 3.2 oz (77.2 kg)       Physical Exam    Pulmonary/Chest: Upper airway congestion nasal congestion   Intermittent constitutional: He is oriented to person, place, and time. He appears well-developed and well-nourished.   HENT:   Head: Normocephalic.   Eyes: Conjunctivae are normal.   Neck: Normal range of motion.   Cardiovascular: Normal rate and regular rhythm.   Murmur heard.  pacemaker   Pulmonary/Chest: No respiratory distress. He has no wheezes.   Abdominal: Soft. Bowel sounds are normal. He exhibits no distension. There is no tenderness.   Musculoskeletal: Normal range of motion. He exhibits no edema.   Neurological: He is alert and oriented to person, place, and time.   Skin: Skin is warm and dry.   Psychiatric: He has a normal mood and affect. His behavior is normal.   LABS:   No results found for this or any previous visit (from the past 240 hour(s)).     Current Outpatient Medications   Medication Sig   ? acetaminophen (TYLENOL) 325 MG tablet Take 650 mg by mouth 4 (four) times a day. Takes at 0200, 0800, 1400, and 2000.   ? bisacodyl (DULCOLAX) 10 mg suppository Insert 10 mg into the rectum daily as needed.   ? calcium carbonate-vitamin D3 (CALCIUM 600 WITH VITAMIN D3) 600 mg(1,500mg) -400 unit cap Take 1 tablet by mouth daily.   ? calcium, as carbonate, (TUMS) 200 mg calcium (500 mg) chewable tablet Chew 1 tablet 3 (three) times a day as needed for heartburn.   ? cloNIDine HCl (CATAPRES) 0.2 MG tablet Take 0.4 mg by mouth 2 (two) times a day.          ? loratadine (CLARITIN) 10 mg tablet Take 10 mg by  mouth daily.   ? metoprolol succinate (TOPROL-XL) 25 MG Take 25 mg by mouth daily.   ? senna-docusate (PERICOLACE) 8.6-50 mg tablet Take 2 tablets by mouth 2 (two) times a day.     Case Management:  I have reviewed the facility/SNF care plan/MDS which was done 7/23/20, including the falls risk, nutrition and pain screening. I also reviewed the current immunizations, and preventive care.. Future cancer screening is not clinically indicated secondary to age/goals of care.   Patient's desire to return to the community is not assessible due to cognitive impairment.    Information reviewed:  Medications, vital signs, orders, and nursing notes.    ASSESSMENT:      ICD-10-CM    1. Generalized muscle weakness  M62.81    2. Benign Essential Hypertension  I10        PLAN:    Hypertension nmetoprolol succinate 25 mg daily and  continue clonidine to 0.4 two times a day.      Atrial fibrillation - not on coumadin      Chronic pain continue Tylenol    Electronically signed by: Michelle Mitchell CNP

## 2021-06-21 NOTE — PROGRESS NOTES
[facility]    Facility:   [facility] [[id number]]   Code Status: DNR/DNI       Chief Complaint   Patient presents with     Review Of Multiple Medical Conditions     LTC 10/30/18.         HPI:   Kelvin is a 89 y.o. male with hx of HTN, chronic pain, compression fractures, prostate cancer, admitted to the hospital on 8/19/18 for weakness. Initial concern for pneumonia. Overall FTT, discharge summary as per below.     Hospital Summary:      #Weakness, progressive.  Failure to thrive.  Patient with chronic multiple compression fractures, not able to participate in therapy.  Family asked that therapy be stopped on the day of discharge.  Options for placement including hospice discussed with family.  -placement long-term care facility without hospice at this time  -falls precautions  -PT/OT discontinued per patient/family request      #Acute hypoxia.  Leukocytosis with no evidence of pneumonia or UTI.  Pro-calcitonin negative so antibiotics discontinued after one-time dose doxycycline.  CT PE negative for clot.  Patient afebrile throughout this hospital stay but does have a new oxygen requirement 3L O2 by NC, likely due to atelectasis with deconditioning.  -continue O2 by NC as required to maintain saturations >90%     Overall stabilized and discharged to [facility] on 8/21/18 for LTC.       Today:  He was admitted here for LTC with no therapy orders but we did order PT and OT for assessments and see how he could do. He is currently alli, not able to participate much. Per  there will be a Hospice consult and he will move to LTC. He has chronic pain, on scheduled opioids, sufficiently managed per his report. He came here on oxygen, subsequently has weaned, now on room air, no longer needs supplemental O2. Appetite is poor. He denies shortness of breath or chest pain. No diarrhea or constipation. Has difficulty hearing, not new. 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       Past Medical History:  Past Medical History:   Diagnosis Date     Aortic stenosis      Blind right eye      Cholelithiasis      Coronary artery disease     atherosclerotic aorta     Diverticular disease      Heart block AV third degree (H)      Heart murmur      Hyperlipidemia      Hypertension      Inguinal hernia     right     L1 vertebral fracture (H) 08/18/2014     Leukocytosis      Pacemaker      Pneumonia      Postoperative atrial fibrillation (H)     Had for few days after pacemaker placement     Prostate cancer (H)     prostate cancer, skin cancer     Syncope 01/2014     T12 vertebral fracture (H) 08/18/2014       Medications:  Current Outpatient Prescriptions   Medication Sig     acetaminophen (TYLENOL) 325 MG tablet Take 650 mg by mouth 4 (four) times a day. Takes at 0200, 0800, 1400, and 2000.     bisacodyl (DULCOLAX) 10 mg suppository Insert 10 mg into the rectum daily as needed.     calcium, as carbonate, (TUMS) 200 mg calcium (500 mg) chewable tablet Chew 1 tablet 3 (three) times a day as needed for heartburn.     cloNIDine HCl (CATAPRES) 0.2 MG tablet Take 0.2 mg by mouth 2 (two) times a day.     leuprolide (LUPRON) 30 mg injection Inject 30 mg into the shoulder, thigh, or buttocks every 4 (four) months.     lisinopril (PRINIVIL,ZESTRIL) 5 MG tablet Take 20 mg by mouth daily.      oxyCODONE (ROXICODONE) 5 MG immediate release tablet Take 0.5 tablets (2.5 mg total) by mouth 3 (three) times a day. Takes at 0800, 1400, and 2000     oxyCODONE (ROXICODONE) 5 MG immediate release tablet Take 0.5 tablets (2.5 mg total) by mouth 2 (two) times a day as needed for pain.     senna-docusate (PERICOLACE) 8.6-50 mg tablet Take 2 tablets by mouth 2 (two) times a day.         Physical Exam:   General: Patient is alert, elderly male, [place], no distress.   Vitals: Reviewed.  HEENT: Head is NCAT. Eyes show no injection or icterus. Nares negative. Oropharynx well hydrated.  Neck: Supple. No tenderness  or adenopathy. No JVD.  Lungs: Diminished at bases. No wheezes.  Cardiovascular: Regular rate and rhythm, systolic murmur.  Back: No spinal or CVA tenderness.  Abdomen: Soft, no tenderness on exam. Bowel sounds present. No guarding rebound or rigidity.  : Deferred.  Extremities: No LE edema is noted.  Musculoskeletal: Age related degen changes.  Skin: Warm and dry.  Psych: Mood appears pretty good.      Labs:  Lab Results   Component Value Date    WBC 13.0 (H) 08/20/2018    HGB 11.5 (L) 08/20/2018    HCT 34.4 (L) 08/20/2018    MCV 96 08/20/2018     08/21/2018     Results for orders placed or performed during the hospital encounter of 08/19/18   Basic metabolic panel   Result Value Ref Range    Sodium 136 136 - 145 mmol/L    Potassium 4.0 3.5 - 5.0 mmol/L    Chloride 98 98 - 107 mmol/L    CO2 24 22 - 31 mmol/L    Anion Gap, Calculation 14 5 - 18 mmol/L    Glucose 101 70 - 125 mg/dL    Calcium 10.4 8.5 - 10.5 mg/dL    BUN 14 8 - 28 mg/dL    Creatinine 0.69 (L) 0.70 - 1.30 mg/dL    GFR MDRD Af Amer >60 >60 mL/min/1.73m2    GFR MDRD Non Af Amer >60 >60 mL/min/1.73m2       Assessment/Plan:  1. Chronic pain. On scheduled oxycodone. Hx multiple compression fractures.  2. HTN. BPs somewhat elevated, cont to monitor.  3. Anemia. Labs as above.  4. Afib. Not on warfarin. Adequate rate control.  5. Hx prostate cancer.          Electronically signed by: Yojana Barahona MD

## 2021-06-21 NOTE — LETTER
Letter by Yojana Barahona MD at      Author: Yojana Barahona MD Service: -- Author Type: --    Filed:  Encounter Date: 10/29/2020 Status: (Other)         Patient: Kelvin Cheney   MR Number: [id number]   YOB: 1921   Date of Visit: 10/29/2020     [facility]    Facility:   [facility] [[id number]]   Code Status: DNR/DNI       Chief Complaint   Patient presents with   ? Review Of Multiple Medical Conditions     LT 10/29/2020.       HPI:   Kelvin is a 89 y.o. male with hx of HTN, chronic pain, compression fractures, prostate cancer, seen for routine physician follow up in LT. He was hospitalized Aug 2018 for concern of pneumonia. Hx of multiple compression fractures, weakness and FTT. Ultimately admitted to LTC at [facility].      Today:  No acute concerns today. He has been fairly stable. He is on clonidine and metoprolol for HTN. He takes scheduled tylenol for pain management related to DJD and hx of compression fractures. No opioids. He is also on bowel meds. No recent medication changes. He denies fever or cough. No open areas of skin. No concerns per nursing. He had care conference on 10/22/2020 with no issues. Appetite has been able. He is very [place], has been doing some video visits with family due to coronavirus related visitor restrictions. He denies headaches, dizziness or palpitations. No shortness of breath or chest pain. No fever reported. He propels himself in his wheelchair.       Past Medical History:  Past Medical History:   Diagnosis Date   ? Aortic stenosis    ? Blind right eye    ? Cholelithiasis    ? Coronary artery disease     atherosclerotic aorta   ? Diverticular disease    ? Heart block AV third degree (H)    ? Heart murmur    ? Hyperlipidemia    ? Hypertension    ? Inguinal hernia     right   ? L1 vertebral fracture (H) 08/18/2014   ? Leukocytosis    ? Pacemaker    ? Pneumonia    ? Postoperative atrial  fibrillation (H)     Had for few days after pacemaker placement   ? Prostate cancer (H)     prostate cancer, skin cancer   ? Syncope 01/2014   ? T12 vertebral fracture (H) 08/18/2014       Medications:  Current Outpatient Medications   Medication Sig   ? acetaminophen (TYLENOL) 325 MG tablet Take 650 mg by mouth 4 (four) times a day. Takes at 0200, 0800, 1400, and 2000.   ? bisacodyl (DULCOLAX) 10 mg suppository Insert 10 mg into the rectum daily as needed.   ? calcium carbonate-vitamin D3 (CALCIUM 600 WITH VITAMIN D3) 600 mg(1,500mg) -400 unit cap Take 1 tablet by mouth daily.   ? calcium, as carbonate, (TUMS) 200 mg calcium (500 mg) chewable tablet Chew 1 tablet 3 (three) times a day as needed for heartburn.   ? cloNIDine HCl (CATAPRES) 0.2 MG tablet Take 0.4 mg by mouth 2 (two) times a day.          ? loratadine (CLARITIN) 10 mg tablet Take 10 mg by mouth daily.   ? metoprolol succinate (TOPROL-XL) 25 MG Take 25 mg by mouth daily.   ? senna-docusate (PERICOLACE) 8.6-50 mg tablet Take 2 tablets by mouth 2 (two) times a day.       Physical Exam:   Note: COVID-19 pandemic precautions in place. Physical exam performed with social distancing considerations.  General: Patient is alert, elderly male, [place], no distress.   Vitals: /84, Temp 98.4, Pulse 60, RR 18, O2 sat 96%RA.  HEENT: Head is NCAT. Eyes show no injection or icterus. Nares negative. Oropharynx well hydrated.  Neck: No JVD.  Lungs: Non labored respirations.  Abdomen: Soft.  Extremities: No LE edema.  Musculoskeletal: Age related degen changes.  Skin: No open areas.   Psych: Mood appears pretty good.      Labs:  Results for orders placed or performed in visit on 04/05/19   Basic Metabolic Panel   Result Value Ref Range    Sodium 138 136 - 145 mmol/L    Potassium 4.1 3.5 - 5.0 mmol/L    Chloride 103 98 - 107 mmol/L    CO2 30 22 - 31 mmol/L    Anion Gap, Calculation 5 5 - 18 mmol/L    Glucose 84 70 - 125 mg/dL    Calcium 10.1 8.5 - 10.5 mg/dL    BUN 16 8  - 28 mg/dL    Creatinine 0.67 (L) 0.70 - 1.30 mg/dL    GFR MDRD Af Amer >60 >60 mL/min/1.73m2    GFR MDRD Non Af Amer >60 >60 mL/min/1.73m2       Lab Results   Component Value Date    WBC 15.4 (H) 12/16/2019    HGB 12.1 (L) 12/16/2019    HCT 37.2 (L) 12/16/2019    MCV 98 12/16/2019     12/16/2019       Assessment/Plan:  1. Debilitation. Advanced age, DJD, hx of compression fractures. Stable.   2. Chronic pain. Stable on scheduled tylenol.  3. HTN. BPs are satisfactory. Continue metoprolol and clonidine.   4. Afib. Not on warfarin. Adequate rate control.  5. Hx of prostate cancer. Sees urology.   6. Vision and hearing impairment. No new concerns.         Electronically signed by: Yojana Barahona MD

## 2021-06-21 NOTE — PROGRESS NOTES
[facility]    Facility:   [facility] [[id number]]   Code Status: DNR/DNI      CHIEF COMPLAINT/REASON FOR VISIT:  Chief Complaint   Patient presents with     Problem Visit     SOB       HISTORY:      HPI: Kelvin is a 89 y.o. male residing at [facility]. He is with history of chronic back pain ( on oxycodone), multiple chronic compression fractures. Hypertension, atrial fibrillation not on coumadin He is new to this facility. He was admitted to [facility] on 8/21/18. He was recently hospitalized on 8/19 for failure to thrive,  weakness and pneumonia      Today he is seen for reports of shortness of breath. He was seen in his room. He denied CP.  He denied HA.  He does have chronic back pain .  His Blood pressures were also  reviewed and noted to be elevated . His lisinopril was increased to 30 mg daily. His weights have been stable. His lung sounds were clear and he was afebrile but he did wince slightly  when taking a deep breath. His sats were stable on RA. He tells me the  shortness of breath is more positional and started approximately 10 days ago. He did report a cough with colored phlegm but could not tell me the color of the phlegm. He reported white, then  Green , then yellow and then back to white.  I will check a chest xray and labs. Last BMP on 8/19/18 was WNL.     Past Medical History:   Diagnosis Date     Aortic stenosis      Blind right eye      Cholelithiasis      Coronary artery disease     atherosclerotic aorta     Diverticular disease      Heart block AV third degree (H)      Heart murmur      Hyperlipidemia      Hypertension      Inguinal hernia     right     L1 vertebral fracture (H) 08/18/2014     Leukocytosis      Pacemaker      Pneumonia      Postoperative atrial fibrillation (H)     Had for few days after pacemaker placement     Prostate cancer (H)     prostate cancer, skin cancer     Syncope 01/2014     T12 vertebral fracture (H)  2014             Family History   Problem Relation Age of Onset     Heart attack Father      Social History     Socioeconomic History     Marital status:      Spouse name: Not on file     Number of children: Not on file     Years of education: Not on file     Highest education level: Not on file   Social Needs     Financial resource strain: Not on file     Food insecurity - worry: Not on file     Food insecurity - inability: Not on file     Transportation needs - medical: Not on file     Transportation needs - non-medical: Not on file   Occupational History     Not on file   Tobacco Use     Smoking status: Former Smoker     Years: 10.00     Last attempt to quit: 1951     Years since quittin.9     Smokeless tobacco: Never Used   Substance and Sexual Activity     Alcohol use: No     Alcohol/week: 1.0 oz     Types: 2 Standard drinks or equivalent per week     Comment: rarely     Drug use: No     Sexual activity: No   Other Topics Concern     Not on file   Social History Narrative    The patient lives in an assisted living facility.          Review of Systems   Constitutional: Positive for fatigue. Negative for activity change, appetite change, chills and fever.   HENT: Positive for hearing loss. Negative for congestion and sore throat.         Wears HA    Eyes: Positive for visual disturbance.        Poor vision    Respiratory: Positive for cough. Negative for shortness of breath and wheezing.    Cardiovascular: Negative for chest pain and leg swelling.   Gastrointestinal: Negative for abdominal distention, abdominal pain, diarrhea and nausea.   Genitourinary: Negative for dysuria.   Musculoskeletal: Negative for arthralgias and myalgias.   Skin: Negative for color change, rash and wound.   Neurological: Positive for weakness. Negative for dizziness and numbness.   Psychiatric/Behavioral: Negative for agitation, behavioral problems, confusion and sleep disturbance.       .  Vitals:    18  0947   BP: 160/82   Pulse: 66   Resp: 20   Temp: 97.1  F (36.2  C)   SpO2: 95%   Weight: 162 lb (73.5 kg)       Physical Exam   Constitutional: He is oriented to person, place, and time. He appears well-developed and well-nourished.   HENT:   Head: Normocephalic.   Eyes: Conjunctivae are normal.   Neck: Normal range of motion.   Cardiovascular: Normal rate and regular rhythm.   Murmur heard.  pacemaker   Pulmonary/Chest: No respiratory distress. He has no wheezes.   Abdominal: Soft. Bowel sounds are normal. He exhibits no distension. There is no tenderness.   Musculoskeletal: Normal range of motion. He exhibits no edema.   Neurological: He is alert and oriented to person, place, and time.   Skin: Skin is warm and dry.   Psychiatric: He has a normal mood and affect. His behavior is normal.         LABS:   No results found for this or any previous visit (from the past 240 hour(s)).  Current Outpatient Medications   Medication Sig     acetaminophen (TYLENOL) 325 MG tablet Take 650 mg by mouth 4 (four) times a day. Takes at 0200, 0800, 1400, and 2000.     bisacodyl (DULCOLAX) 10 mg suppository Insert 10 mg into the rectum daily as needed.     calcium, as carbonate, (TUMS) 200 mg calcium (500 mg) chewable tablet Chew 1 tablet 3 (three) times a day as needed for heartburn.     cloNIDine HCl (CATAPRES) 0.2 MG tablet Take 0.2 mg by mouth 2 (two) times a day.     leuprolide (LUPRON) 30 mg injection Inject 30 mg into the shoulder, thigh, or buttocks every 4 (four) months.     lisinopril (PRINIVIL,ZESTRIL) 5 MG tablet Take 20 mg by mouth daily.      oxyCODONE (ROXICODONE) 5 MG immediate release tablet Take 0.5 tablets (2.5 mg total) by mouth 3 (three) times a day. Takes at 0800, 1400, and 2000     oxyCODONE (ROXICODONE) 5 MG immediate release tablet Take 0.5 tablets (2.5 mg total) by mouth 2 (two) times a day as needed for pain.     senna-docusate (PERICOLACE) 8.6-50 mg tablet Take 2 tablets by mouth 2 (two) times a day.      ASSESSMENT:      ICD-10-CM    1. Benign Essential Hypertension I10    2. SOB (shortness of breath) R06.02        PLAN:    Hypertension- increase lisinopril to 30 mg daily  Constipation- resolved  Back pain-  oxycodone - chronic  Weakness. Overall failure to thrive, advanced age.   Atrial fibrillation - not on coumadin   Cough shortness of breath- chest x-ray, CBC.BMP    Electronically signed by: Michelle Mitchell CNP

## 2021-06-21 NOTE — LETTER
Letter by Michelle Mitchell CNP at      Author: Michelle Mitchell CNP Service: -- Author Type: --    Filed:  Encounter Date: 12/9/2020 Status: (Other)         Patient: Kelvin Cheney   MR Number: [id number]   YOB: 1921   Date of Visit: 12/9/2020     [facility]    Facility:   [facility] [[id number]]   Code Status: DNR      CHIEF COMPLAINT/REASON FOR VISIT:  Chief Complaint   Patient presents with   ? FVP Care Coordination - Regulatory       HISTORY:      HPI: Kelvin is a 89 y.o. male  residing at [facility]. He is with history of chronic back pain ( on Tylenol), multiple chronic compression fractures. Hypertension, atrial fibrillation not on coumadin       Today he is seen for  a routine regulatory visit. He denied any discomfort.  .  He denied CP.  He denied HA. His weights have been stable over the last month. His BP's are stable.  He is moving his bowels and had no issues with urination.  He has no active coughing or congestion at this time.  Vital signs are stable and he tells me he feels good.  Staff has no concerns    Past Medical History:   Diagnosis Date   ? Aortic stenosis    ? Blind right eye    ? Cholelithiasis    ? Coronary artery disease     atherosclerotic aorta   ? Diverticular disease    ? Heart block AV third degree (H)    ? Heart murmur    ? Hyperlipidemia    ? Hypertension    ? Inguinal hernia     right   ? L1 vertebral fracture (H) 08/18/2014   ? Leukocytosis    ? Pacemaker    ? Pneumonia    ? Postoperative atrial fibrillation (H)     Had for few days after pacemaker placement   ? Prostate cancer (H)     prostate cancer, skin cancer   ? Syncope 01/2014   ? T12 vertebral fracture (H) 08/18/2014             Family History   Problem Relation Age of Onset   ? Heart attack Father      Social History     Socioeconomic History   ? Marital status:      Spouse name: Not on file   ? Number of children: Not on file   ? Years  of education: Not on file   ? Highest education level: Not on file   Occupational History   ? Not on file   Social Needs   ? Financial resource strain: Not on file   ? Food insecurity     Worry: Not on file     Inability: Not on file   ? Transportation needs     Medical: Not on file     Non-medical: Not on file   Tobacco Use   ? Smoking status: Former Smoker     Years: 10.00     Quit date: 1951     Years since quittin.9   ? Smokeless tobacco: Never Used   Substance and Sexual Activity   ? Alcohol use: No     Alcohol/week: 1.7 standard drinks     Types: 2 Standard drinks or equivalent per week     Comment: rarely   ? Drug use: No   ? Sexual activity: Never   Lifestyle   ? Physical activity     Days per week: Not on file     Minutes per session: Not on file   ? Stress: Not on file   Relationships   ? Social connections     Talks on phone: Not on file     Gets together: Not on file     Attends Islam service: Not on file     Active member of club or organization: Not on file     Attends meetings of clubs or organizations: Not on file     Relationship status: Not on file   ? Intimate partner violence     Fear of current or ex partner: Not on file     Emotionally abused: Not on file     Physically abused: Not on file     Forced sexual activity: Not on file   Other Topics Concern   ? Not on file   Social History Narrative    The patient lives in an assisted living facility.          Review of Systems  Constitutional: Positive for fatigue. Negative for activity change, appetite change, chills and fever.   HENT: Positive for  hearing loss. Negative for sore throat.         Wears HA    Eyes: Positive for visual disturbance.        Poor vision    Respiratory:. Negative for shortness of breath and wheezing.    Cardiovascular: Negative for chest pain and leg swelling.   Gastrointestinal: Negative for abdominal distention, abdominal pain, diarrhea and nausea.   Genitourinary: Negative for dysuria.   Musculoskeletal:  Negative for arthralgias and myalgias.   Skin: Negative for color change, rash and wound.   Neurological: Positive for weakness. Negative for dizziness and numbness.   Psychiatric/Behavioral: Negative for agitation, behavioral problems, confusion and sleep disturbance. .  Vitals:    12/09/20 0842   BP: 150/84   Pulse: 61   Resp: 18   Temp: 98.7  F (37.1  C)   SpO2: 93%   Weight: 167 lb 8 oz (76 kg)       Physical Exam    Pulmonary/Chest: Upper airway congestion nasal congestion   Intermittent constitutional: He is oriented to person, place, and time. He appears well-developed and well-nourished.   HENT:   Head: Normocephalic.   Eyes: Conjunctivae are normal.   Neck: Normal range of motion.   Cardiovascular: Normal rate and regular rhythm.   Murmur heard.  pacemaker   Pulmonary/Chest: No respiratory distress. He has no wheezes.   Abdominal: Soft. Bowel sounds are normal. He exhibits no distension. There is no tenderness.   Musculoskeletal: Normal range of motion. He exhibits no edema.   Neurological: He is alert and oriented to person, place, and time.   Skin: Skin is warm and dry.   Psychiatric: He has a normal mood and affect. His behavior is normal.   LABS:   No results found for this or any previous visit (from the past 240 hour(s)).     Current Outpatient Medications   Medication Sig   ? acetaminophen (TYLENOL) 325 MG tablet Take 650 mg by mouth 4 (four) times a day. Takes at 0200, 0800, 1400, and 2000.   ? bisacodyl (DULCOLAX) 10 mg suppository Insert 10 mg into the rectum daily as needed.   ? calcium carbonate-vitamin D3 (CALCIUM 600 WITH VITAMIN D3) 600 mg(1,500mg) -400 unit cap Take 1 tablet by mouth daily.   ? calcium, as carbonate, (TUMS) 200 mg calcium (500 mg) chewable tablet Chew 1 tablet 3 (three) times a day as needed for heartburn.   ? cloNIDine HCl (CATAPRES) 0.2 MG tablet Take 0.4 mg by mouth 2 (two) times a day.          ? loratadine (CLARITIN) 10 mg tablet Take 10 mg by mouth daily.   ?  metoprolol succinate (TOPROL-XL) 25 MG Take 25 mg by mouth daily.   ? senna-docusate (PERICOLACE) 8.6-50 mg tablet Take 2 tablets by mouth 2 (two) times a day.     Case Management:  I have reviewed the facility/SNF care plan/MDS which was done 10/21/20 including the falls risk, nutrition and pain screening. I also reviewed the current immunizations, and preventive care.. Future cancer screening is not clinically indicated secondary to age/goals of care.   Patient's desire to return to the community is not assessible due to cognitive impairment.    Information reviewed:  Medications, vital signs, orders, and nursing notes.    ASSESSMENT:      ICD-10-CM    1. Benign Essential Hypertension  I10    2. Physical deconditioning  R53.81        PLAN:    Hypertension nmetoprolol succinate 25 mg daily and  continue clonidine to 0.4 two times a day.      Atrial fibrillation - not on coumadin      Chronic pain continue Tylenol    Electronically signed by: Michelle Mitchell CNP

## 2021-06-21 NOTE — LETTER
Letter by Jacqui Walker RDCS at      Author: Jacqui Walker RDCS Service: -- Author Type: --    Filed:  Encounter Date: 1/5/2021 Status: (Other)         Kelvin Cheney  [address]  [place] [address]      January 5, 2021      Dear Mr. Cheney,    RE: Remote Results    We are writing to you regarding your recent Remote Pacemaker check from home. Your transmission was received successfully. Battery status is satisfactory at this time.     Your results are showing no significant changes.    Your next device appointment will be a remote check on April 6, 2021; this will occur automatically.    To schedule or reschedule, please call [phone] and press 1.    NOTE: If you would like to do an extra transmission, please call [phone] and press 3 to speak to a nurse BEFORE transmitting. This ensures that the Device Clinic staff is aware of the reason you are sending a transmission, and can follow-up with you after it has been reviewed.    We will be checking your implanted device from home (remotely) every three months unless otherwise instructed. We will need to see you in the clinic at least once a year. You may need to be seen in the clinic sooner depending on the results of your check.    Please be aware:    The follow-up schedule is like a Physician prescription.    Your remote monitor is paired to your specific implanted device.      Sincerely,    [facility]

## 2021-06-22 NOTE — PROGRESS NOTES
[facility]    Facility:   [facility] [[id number]]   Code Status: DNR/DNI      CHIEF COMPLAINT/REASON FOR VISIT:  Chief Complaint   Patient presents with     Review Of Multiple Medical Conditions       HISTORY:      HPI: Kelvin is a 89 y.o. male residing at [facility]. He is with history of chronic back pain ( on oxycodone), multiple chronic compression fractures. Hypertension, atrial fibrillation not on coumadin He is new to this facility. He was admitted to [facility] on 8/21/18. He was recently hospitalized on 8/19 for failure to thrive,  weakness and pneumonia      Today he is seen for a routine medical visit to review multiple medical issues. . He was seen in his room. He denied CP.  He denied HA.  He does have chronic back pain but tells me today his pain is controlled.  .  His Blood pressures were also  reviewed and noted to be elevated with a few lower readings.   His lisinopril was increased to 40 mg daily. His weights is down 5 pounds over the last 2 months..  He reported intermittent abdominal pain which is relieved with Tums. [facility] 11/26 WNL.    Past Medical History:   Diagnosis Date     Aortic stenosis      Blind right eye      Cholelithiasis      Coronary artery disease     atherosclerotic aorta     Diverticular disease      Heart block AV third degree (H)      Heart murmur      Hyperlipidemia      Hypertension      Inguinal hernia     right     L1 vertebral fracture (H) 08/18/2014     Leukocytosis      Pacemaker      Pneumonia      Postoperative atrial fibrillation (H)     Had for few days after pacemaker placement     Prostate cancer (H)     prostate cancer, skin cancer     Syncope 01/2014     T12 vertebral fracture (H) 08/18/2014             Family History   Problem Relation Age of Onset     Heart attack Father      Social History     Socioeconomic History     Marital status:      Spouse name: Not on file     Number of children: Not on file      Years of education: Not on file     Highest education level: Not on file   Social Needs     Financial resource strain: Not on file     Food insecurity - worry: Not on file     Food insecurity - inability: Not on file     Transportation needs - medical: Not on file     Transportation needs - non-medical: Not on file   Occupational History     Not on file   Tobacco Use     Smoking status: Former Smoker     Years: 10.00     Last attempt to quit: 1951     Years since quittin.0     Smokeless tobacco: Never Used   Substance and Sexual Activity     Alcohol use: No     Alcohol/week: 1.0 oz     Types: 2 Standard drinks or equivalent per week     Comment: rarely     Drug use: No     Sexual activity: No   Other Topics Concern     Not on file   Social History Narrative    The patient lives in an assisted living facility.          Review of Systems   Constitutional: Positive for fatigue. Negative for activity change, appetite change, chills and fever.   HENT: Positive for hearing loss. Negative for congestion and sore throat.         Wears HA    Eyes: Positive for visual disturbance.        Poor vision    Respiratory: Positive for cough. Negative for shortness of breath and wheezing.    Cardiovascular: Negative for chest pain and leg swelling.   Gastrointestinal: Negative for abdominal distention, abdominal pain, diarrhea and nausea.   Genitourinary: Negative for dysuria.   Musculoskeletal: Negative for arthralgias and myalgias.   Skin: Negative for color change, rash and wound.   Neurological: Positive for weakness. Negative for dizziness and numbness.   Psychiatric/Behavioral: Negative for agitation, behavioral problems, confusion and sleep disturbance.       .  Vitals:    19 0853   BP: 147/85   Pulse: 62   Resp: 20   Temp: 98.2  F (36.8  C)   SpO2: 95%   Weight: 157 lb (71.2 kg)       Physical Exam   Constitutional: He is oriented to person, place, and time. He appears well-developed and well-nourished.    HENT:   Head: Normocephalic.   Eyes: Conjunctivae are normal.   Neck: Normal range of motion.   Cardiovascular: Normal rate and regular rhythm.   Murmur heard.  pacemaker   Pulmonary/Chest: No respiratory distress. He has no wheezes. He has rales.   LLL   Abdominal: Soft. Bowel sounds are normal. He exhibits no distension. There is no tenderness.   Musculoskeletal: Normal range of motion. He exhibits no edema.   Neurological: He is alert and oriented to person, place, and time.   Skin: Skin is warm and dry.   Psychiatric: He has a normal mood and affect. His behavior is normal.         LABS:   No results found for this or any previous visit (from the past 240 hour(s)).  Current Outpatient Medications   Medication Sig     acetaminophen (TYLENOL) 325 MG tablet Take 650 mg by mouth 4 (four) times a day. Takes at 0200, 0800, 1400, and 2000.     bisacodyl (DULCOLAX) 10 mg suppository Insert 10 mg into the rectum daily as needed.     calcium, as carbonate, (TUMS) 200 mg calcium (500 mg) chewable tablet Chew 1 tablet 3 (three) times a day as needed for heartburn.     cloNIDine HCl (CATAPRES) 0.2 MG tablet Take 0.2 mg by mouth 2 (two) times a day.     leuprolide (LUPRON) 30 mg injection Inject 30 mg into the shoulder, thigh, or buttocks every 4 (four) months.     lisinopril (PRINIVIL,ZESTRIL) 5 MG tablet Take 40 mg by mouth daily.            oxyCODONE (ROXICODONE) 5 MG immediate release tablet Take 0.5 tablets (2.5 mg total) by mouth 3 (three) times a day. Takes at 0800, 1400, and 2000     oxyCODONE (ROXICODONE) 5 MG immediate release tablet Take 0.5 tablets (2.5 mg total) by mouth 2 (two) times a day as needed for pain.     senna-docusate (PERICOLACE) 8.6-50 mg tablet Take 2 tablets by mouth 2 (two) times a day.     ASSESSMENT:      ICD-10-CM    1. Benign Essential Hypertension I10    2. Chronic pain syndrome G89.4    3. Atrial fibrillation, unspecified type (H) I48.91    4. Generalized muscle weakness M62.81         PLAN:    Hypertension- increase lisinopril to 40 mg daily  Constipation- resolved  Back pain-  oxycodone - chronic denied today.   Weakness. Overall failure to thrive, advanced age.   Atrial fibrillation - not on coumadin   Cough reports mostly at night chronic     Electronically signed by: Michelle Mitchell CNP

## 2021-06-24 NOTE — PROGRESS NOTES
Spoke with Sophie (family member), Pt has upcoming device and DND appt with an echo a few days prior.     Sophie reports that Kelvin's mobility has declined significantly and she states that heis unable to get the echo completed. Pt is not able to transfer (can't bear weight) or lay flat (due to pain). She asked that I notify Dr. Donovan, that she will be cancelling the echo, but plans on coming to the Device check and Dr. Donovan appt.     She does note at that appointment she may have a discussion with us about this being the final appt and just have the care center MD follow the patient, do remote checks for his pacemaker and have a [employer] rep come out once a year if needed. She will explore this further at the appointment.     Nevaeh Darby RN BSN PHN  Device Nurse   [facility]

## 2021-06-24 NOTE — PROGRESS NOTES
[facility]    Facility:   [facility] [[id number]]   Code Status: DNR/DNI       Chief Complaint   Patient presents with     Review Of Multiple Medical Conditions     LT 2/26/19.       HPI:   Kelvin is a 89 y.o. male with hx of HTN, chronic pain, compression fractures, prostate cancer, seen for routine physician follow up in LT. He was hospitalized Aug 2018 for concern of pneumonia. Hx of multiple compression fractures, weakness and FTT. Ultimately admitted to LT here.    Today:  He is essentially non ambulatory but able to propel self in wheelchair. He is [place], likes to read in his room. Appetite is stable. Complains of chronic cough, not really noted by staff. No fever or hypoxia. He has chronic pain, on scheduled opioids, sufficiently managed per his report. He denies shortness of breath or chest pain. No diarrhea or constipation.      Past Medical History:  Past Medical History:   Diagnosis Date     Aortic stenosis      Blind right eye      Cholelithiasis      Coronary artery disease     atherosclerotic aorta     Diverticular disease      Heart block AV third degree (H)      Heart murmur      Hyperlipidemia      Hypertension      Inguinal hernia     right     L1 vertebral fracture (H) 08/18/2014     Leukocytosis      Pacemaker      Pneumonia      Postoperative atrial fibrillation (H)     Had for few days after pacemaker placement     Prostate cancer (H)     prostate cancer, skin cancer     Syncope 01/2014     T12 vertebral fracture (H) 08/18/2014       Medications:  Current Outpatient Medications   Medication Sig     acetaminophen (TYLENOL) 325 MG tablet Take 650 mg by mouth 4 (four) times a day. Takes at 0200, 0800, 1400, and 2000.     bisacodyl (DULCOLAX) 10 mg suppository Insert 10 mg into the rectum daily as needed.     calcium, as carbonate, (TUMS) 200 mg calcium (500 mg) chewable tablet Chew 1 tablet 3 (three) times a day as needed for heartburn.      cloNIDine HCl (CATAPRES) 0.2 MG tablet Take 0.2 mg by mouth 2 (two) times a day.     leuprolide (LUPRON) 30 mg injection Inject 30 mg into the shoulder, thigh, or buttocks every 4 (four) months.     lisinopril (PRINIVIL,ZESTRIL) 5 MG tablet Take 40 mg by mouth daily.            oxyCODONE (ROXICODONE) 5 MG immediate release tablet Take 0.5 tablets (2.5 mg total) by mouth 3 (three) times a day. Takes at 0800, 1400, and 2000     oxyCODONE (ROXICODONE) 5 MG immediate release tablet Take 0.5 tablets (2.5 mg total) by mouth 2 (two) times a day as needed for pain.     senna-docusate (PERICOLACE) 8.6-50 mg tablet Take 2 tablets by mouth 2 (two) times a day.       Physical Exam:   General: Patient is alert, elderly male, [place], no distress.   Vitals: Reviewed.  HEENT: Head is NCAT. Eyes show no injection or icterus. Nares negative. Oropharynx well hydrated.  Neck: Supple. No tenderness or adenopathy. No JVD.  Lungs: Diminished at bases. No wheezes.  Cardiovascular: Regular rate and rhythm, systolic murmur.  Back: No spinal or CVA tenderness.  Abdomen: Soft, no tenderness on exam. Bowel sounds present. No guarding rebound or rigidity.  : Deferred.  Extremities: No LE edema is noted.  Musculoskeletal: Age related degen changes.  Skin: Warm and dry.  Psych: Mood appears pretty good.      Labs:  Lab Results   Component Value Date    WBC 11.0 11/15/2018    HGB 12.6 (L) 11/15/2018    HCT 39.9 (L) 11/15/2018     (H) 11/15/2018     11/15/2018     Results for orders placed or performed in visit on 11/26/18   Basic Metabolic Panel   Result Value Ref Range    Sodium 137 136 - 145 mmol/L    Potassium 4.3 3.5 - 5.0 mmol/L    Chloride 100 98 - 107 mmol/L    CO2 29 22 - 31 mmol/L    Anion Gap, Calculation 8 5 - 18 mmol/L    Glucose 93 70 - 125 mg/dL    Calcium 10.4 8.5 - 10.5 mg/dL    BUN 18 8 - 28 mg/dL    Creatinine 0.75 0.70 - 1.30 mg/dL    GFR MDRD Af Amer >60 >60 mL/min/1.73m2    GFR MDRD Non Af Amer >60 >60  mL/min/1.73m2         Assessment/Plan:  1. HTN. On antihypertensives. Cont to monitor.  2. Afib. Not on warfarin. Adequate rate control.  3. Chronic pain. Stable.  4. Anemia. Labs as noted.  5. Hx prostate cancer. Recently saw urology for follow up.        Electronically signed by: Yojana Barahona MD

## 2021-06-25 NOTE — PROGRESS NOTES
Progress Notes by Samuel Donovan MD at 3/27/2017 10:50 AM     Author: Samuel Donovan MD Service: -- Author Type: Physician    Filed: 3/27/2017 11:08 AM Encounter Date: 3/27/2017 Status: Signed    : Samuel Donovan MD (Physician)           Click to link to [facility] ELECTROPHYSIOLOGY NOTE    Today I had the opportunity to see  Kelvin Cheney for follow-up evaluation of third degree AV block with permanent pacemaker and mild aortic stenosis.      Assessment/Recommendations   Clinic Problem List:  1. Third degree AV block     2. Aortic valve stenosis, mild  Echo Complete       Assessment:    Third degree AV block, normally functioning dual-chamber pacemaker and no evidence for pacing mediated cardiomyopathy  Mild aortic stenosis, 15 mm mean gradient, asymptomatic  Hypertension controlled    Plan:  Continue current medications and keep walking  Cardiac echo in 1 year to reevaluate mild aortic stenosis  Follow up appointment:   Dr. Donovan in device clinic 1 week after cardiac echo       History of Present Illness     Kelvin Cheney is a 89 y.o. male with 3rd-degree AV block. His permanent pacemaker was implanted at [facility] on 01/16/2014. Subsequent pacemaker checks showed excellent atrial and ventricular pacing thresholds, with good thresholds. Patient was pacing in the ventricle 98% of the time. He has had no syncopal episodes or falls since the pacemaker placement. The patient has had a long history of hypertension, over 50 years. He has known of a heart murmur for at least several years.  Cardiac echo 8/18/2014 showed normal left ventricular function ejection fraction 60% mild left ventricular hypertrophy and mean aortic valve gradient of 13 consistent with mild aortic stenosis.  Cardiac echo on 4/6/2016 showed normal left ventricular function mildly enlarged left atrium and mild aortic stenosis with mean gradient of 15.  Study was technically difficult  but there was no significant change from the echo in August 2014.  Kelvin has felt generally well but notes some shortness of breath with activity.  He walks slowly with a walker.  Symptoms have not changed appreciably over the last year.  He denies exertional chest pain or pressure.  He has slipped and fallen once.    Personally reviewed.  Pacemaker interrogation today shows 50% atrial pacing 99% ventricular pacing.  There was no atrial fibrillation and no ventricular tachycardia.  Atrial and ventricular pacing and sensing thresholds are excellent.  Estimated battery longevity is greater than 9 years.  He does have third-degree AV block and is pacemaker dependent.       Physical Examination Review of Systems   Vitals:    03/27/17 1034   BP: 150/70   Pulse: 72   Resp: 18     Body mass index is 29.05 kg/(m^2).  Wt Readings from Last 3 Encounters:   03/27/17 180 lb (81.6 kg)   03/16/16 174 lb (78.9 kg)   12/08/15 175 lb (79.4 kg)        Appearance:   no distress, elderly    HEENT:   no scleral icterus, normal conjunctivae    Neck: no carotid bruits or thyromegaly   Chest/Lungs:   lungs are clear to auscultation, no rales or wheezing, pacemaker and left subclavian pocket    Cardiovascular:   Jugular venous pressure 5 cm, Apical pulse is regular. Normal S1,S2 with 2/6 systolic ejection murmur,   Abdomen:  no  Hepatosplenomegaly., nontender,  bowel sounds are present   Extremities: no cyanosis or clubbing, No edema   Skin: no xanthelasma, warm.    Neurologic: No gross focal neurologic deficits   Mood/Affect: Alert, cooperative    General: WNL  Eyes: WNL  Ears/Nose/Throat: WNL  Lungs: WNL  Heart: WNL  Stomach: WNL  Bladder: WNL  Muscle/Joints: WNL  Skin: WNL  Nervous System: WNL  Mental Health: WNL     Blood: WNL     Medical History  Surgical History Family History Social History   Past Medical History:   Diagnosis Date   ? Aortic stenosis    ? Blind right eye    ? Cholelithiasis    ? Coronary artery disease      atherosclerotic aorta   ? Diverticular disease    ? Heart block AV third degree    ? Heart murmur    ? Hyperlipidemia    ? Hypertension    ? Inguinal hernia     right   ? L1 vertebral fracture 08/18/2014   ? Leukocytosis    ? Pacemaker    ? Postoperative atrial fibrillation     Had for few days after pacemaker placement   ? Prostate cancer    ? Syncope 01/2014   ? T12 vertebral fracture 08/18/2014    Past Surgical History:   Procedure Laterality Date   ? CARDIAC PACEMAKER PLACEMENT  1/16/2014    Third degree AV block   ? CYSTOSCOPY N/A 1/9/2015    Procedure: CYSTOSCOPY, DILATION OF URETHRAL STRICTURE, CATHETER PLACEMENT AND  BIOPSY;  Surgeon: Matthew Veliz MD;  Location: [facility] OR;  Service:    ? PROSTATECTOMY      Family History   Problem Relation Age of Onset   ? Heart attack Father     Social History     Social History   ? Marital status:      Spouse name: N/A   ? Number of children: N/A   ? Years of education: N/A     Occupational History   ? Not on file.     Social History Main Topics   ? Smoking status: Former Smoker     Years: 10.00     Quit date: 1/1/1951   ? Smokeless tobacco: Never Used   ? Alcohol use 1.0 oz/week     2 drink(s) per week   ? Drug use: No   ? Sexual activity: No     Other Topics Concern   ? Not on file     Social History Narrative    The patient lives in an assisted living facility.           Medications  Allergies   Current Outpatient Prescriptions   Medication Sig Dispense Refill   ? acetaminophen (TYLENOL) 500 MG tablet Take 1,000 mg by mouth 3 (three) times a day.      ? alendronate (FOSAMAX) 70 MG tablet Take 70 mg by mouth every 7 days. Take in the morning on an empty stomach with a full glass of water 30 minutes before food     ? calcium citrate-vitamin D3 (CITRACAL + D) 315-250 mg-unit per tablet Take 1 tablet by mouth 2 (two) times a day.     ? cloNIDine HCl (CATAPRES) 0.1 MG tablet TWO TABS IN AM,  ONE TAB AT NOON,  AND TWO TABS AT HS (Patient taking  differently: TWO TABS IN AM AND TWO TABS AT HS)     ? docusate sodium (COLACE) 100 MG capsule Take 100 mg by mouth 2 (two) times a day.      ? leuprolide (LUPRON) 30 mg injection Inject 30 mg into the shoulder, thigh, or buttocks every 4 (four) months.     ? lisinopril (PRINIVIL,ZESTRIL) 10 MG tablet 1 tablet daily -[ez-open-cap] 30 tablet 11   ? multivitamin (ONE A DAY) per tablet Take 1 tablet by mouth daily.     ? aspirin 81 MG EC tablet Take 81 mg by mouth daily.      ? atorvastatin (LIPITOR) 10 MG tablet Take 10 mg by mouth bedtime.     ? psyllium seed, sugar, (METAMUCIL) Powd Take 1 Package by mouth daily.        No current facility-administered medications for this visit.       No Known Allergies

## 2021-06-26 NOTE — PROGRESS NOTES
Progress Notes by Samuel Donovan MD at 3/7/2018 10:20 AM     Author: Samuel Donovan MD Service: -- Author Type: Physician    Filed: 3/7/2018 10:32 AM Encounter Date: 3/7/2018 Status: Signed    : Samuel Donovan MD (Physician)           Click to link to [facility] HEART [facility] ELECTROPHYSIOLOGY NOTE    Today I had the opportunity to see  Kelvin Cheney for follow-up evaluation of third-degree AV block.      Assessment/Recommendations   Clinic Problem List:  1. Third degree AV block     2. Aortic valve stenosis, mild     3. Benign Essential Hypertension         Assessment:    Third-degree AV block normal pacemaker function  Mild aortic stenosis asymptomatic  Hypertension controlled    Plan:  Reschedule echocardiogram for 6 months from now  Follow up appointment:   Device clinic appointment with Dr. Donovan on the same day as as his echo       History of Present Illness     Kelvin Cheney is a 89 y.o. male with with 3rd-degree AV block. His permanent pacemaker was implanted at [facility] in [place] on 01/16/2014. Subsequent pacemaker checks showed excellent atrial and ventricular pacing thresholds, with good thresholds. Patient was pacing in the ventricle 98% of the time. He has had no syncopal episodes or falls since the pacemaker placement. The patient has had a long history of hypertension, over 50 years. He has known of a heart murmur for at least several years.  Cardiac echo 8/18/2014 showed normal left ventricular function ejection fraction 60% mild left ventricular hypertrophy and mean aortic valve gradient of 13 consistent with mild aortic stenosis.  Cardiac echo on 4/6/2016 showed normal left ventricular function mildly enlarged left atrium and mild aortic stenosis with mean gradient of 15.    Kelvin has been feeling reasonably well.  His activities primarily limited by chronic low back pain related to lumbar compression fractures.  He spends much of his time in his  wheelchair.  There is no dyspnea on exertion or exertional chest pain is unaware of any palpitations there is been no syncope.  He denies orthostatic lightheadedness    Personally reviewed.  Pacemaker interrogation patient is in sinus rhythm with third-degree AV block and underlying junctional escape rhythm at rate 40 bpm.  He had a very brief episode of atrial fibrillation in July 2017 lasting about approximately an hour and a half possibly associated with an upper respiratory tract infection.  There is been no further atrial fibrillation since that time.  Atrial and ventricular pacing and sensing thresholds are excellent.  Estimated battery longevity is 8.5 years.       Physical Examination Review of Systems   Vitals:    03/07/18 1004   BP: 114/62   Pulse: 60   Resp: 16     Body mass index is 25.82 kg/(m^2).  Wt Readings from Last 3 Encounters:   03/07/18 160 lb (72.6 kg)   03/27/17 180 lb (81.6 kg)   03/16/16 174 lb (78.9 kg)        Appearance:   no distress, elderly gentleman in a wheelchair   HEENT:   no scleral icterus, normal conjunctivae    Neck: no carotid bruits or thyromegaly   Chest/Lungs:   lungs are clear to auscultation, no rales or wheezing, pacemaker left subclavian pocket   Cardiovascular:   Jugular venous pressure 5 cm, Apical pulse is regular. Normal S1,S2 with grade 2/6 systolic ejection murmur along the left sternal border   Abdomen:  no  Hepatosplenomegaly., nontender,  bowel sounds are present   Extremities: no cyanosis or clubbing, No edema   Skin: no xanthelasma, warm.    Neurologic: No gross focal neurologic deficits   Mood/Affect: Alert, cooperative    General: WNL  Eyes: WNL  Ears/Nose/Throat: WNL  Lungs: WNL  Heart: WNL  Stomach: WNL  Bladder: WNL  Muscle/Joints: WNL  Skin: WNL  Nervous System: WNL  Mental Health: WNL     Blood: WNL     Medical History  Surgical History Family History Social History   Past Medical History:   Diagnosis Date   ? Aortic stenosis    ? Blind right eye    ?  Cholelithiasis    ? Coronary artery disease     atherosclerotic aorta   ? Diverticular disease    ? Heart block AV third degree    ? Heart murmur    ? Hyperlipidemia    ? Hypertension    ? Inguinal hernia     right   ? L1 vertebral fracture 08/18/2014   ? Leukocytosis    ? Pacemaker    ? Postoperative atrial fibrillation     Had for few days after pacemaker placement   ? Prostate cancer    ? Syncope 01/2014   ? T12 vertebral fracture 08/18/2014    Past Surgical History:   Procedure Laterality Date   ? CARDIAC PACEMAKER PLACEMENT  1/16/2014    Third degree AV block   ? CYSTOSCOPY N/A 1/9/2015    Procedure: CYSTOSCOPY, DILATION OF URETHRAL STRICTURE, CATHETER PLACEMENT AND  BIOPSY;  Surgeon: Matthew Veliz MD;  Location: [facility] OR;  Service:    ? PROSTATECTOMY      Family History   Problem Relation Age of Onset   ? Heart attack Father     Social History     Social History   ? Marital status:      Spouse name: N/A   ? Number of children: N/A   ? Years of education: N/A     Occupational History   ? Not on file.     Social History Main Topics   ? Smoking status: Former Smoker     Years: 10.00     Quit date: 1/1/1951   ? Smokeless tobacco: Never Used   ? Alcohol use 1.0 oz/week     2 drink(s) per week   ? Drug use: No   ? Sexual activity: No     Other Topics Concern   ? Not on file     Social History Narrative    The patient lives in an assisted living facility.           Medications  Allergies   Current Outpatient Prescriptions   Medication Sig Dispense Refill   ? acetaminophen (TYLENOL) 500 MG tablet Take 1,000 mg by mouth 3 (three) times a day.      ? alendronate (FOSAMAX) 70 MG tablet Take 70 mg by mouth every 7 days. Take in the morning on an empty stomach with a full glass of water 30 minutes before food     ? aspirin 81 MG EC tablet Take 81 mg by mouth daily.      ? atorvastatin (LIPITOR) 10 MG tablet Take 10 mg by mouth bedtime.     ? calcium citrate-vitamin D3 (CITRACAL + D) 315-250 mg-unit  per tablet Take 1 tablet by mouth 2 (two) times a day.     ? cloNIDine HCl (CATAPRES) 0.1 MG tablet TWO TABS IN AM,  ONE TAB AT NOON,  AND TWO TABS AT HS (Patient taking differently: TWO TABS IN AM AND TWO TABS AT HS)     ? docusate sodium (COLACE) 100 MG capsule Take 100 mg by mouth 2 (two) times a day.      ? leuprolide (LUPRON) 30 mg injection Inject 30 mg into the shoulder, thigh, or buttocks every 4 (four) months.     ? lisinopril (PRINIVIL,ZESTRIL) 10 MG tablet 1 tablet daily -[ez-open-cap] (Patient taking differently: 1 tablet daily - ez-open-cap ) 30 tablet 11   ? multivitamin (ONE A DAY) per tablet Take 1 tablet by mouth daily.     ? oxyCODONE (ROXICODONE) 5 MG immediate release tablet TAKE 1 2 TAB (2.5MG) BY MOUTH TWICE A DAY;TAKE 1 2 TAB (2.5MG) BY MOUTH ONCE DAILY AS NEEDED  0   ? psyllium seed, sugar, (METAMUCIL) Powd Take 1 Package by mouth daily.        No current facility-administered medications for this visit.       No Known Allergies